# Patient Record
Sex: MALE | Race: ASIAN | NOT HISPANIC OR LATINO | Employment: OTHER | URBAN - METROPOLITAN AREA
[De-identification: names, ages, dates, MRNs, and addresses within clinical notes are randomized per-mention and may not be internally consistent; named-entity substitution may affect disease eponyms.]

---

## 2018-06-19 ENCOUNTER — HOSPITAL ENCOUNTER (EMERGENCY)
Facility: HOSPITAL | Age: 33
Discharge: HOME | End: 2018-06-19
Attending: EMERGENCY MEDICINE | Admitting: EMERGENCY MEDICINE
Payer: MEDICARE

## 2018-06-19 VITALS
HEIGHT: 72 IN | RESPIRATION RATE: 18 BRPM | DIASTOLIC BLOOD PRESSURE: 86 MMHG | OXYGEN SATURATION: 96 % | SYSTOLIC BLOOD PRESSURE: 144 MMHG | HEART RATE: 86 BPM | TEMPERATURE: 99.1 F | BODY MASS INDEX: 31.15 KG/M2 | WEIGHT: 230 LBS

## 2018-06-19 DIAGNOSIS — R21 RASH: Primary | ICD-10-CM

## 2018-06-19 PROCEDURE — 99283 EMERGENCY DEPT VISIT LOW MDM: CPT

## 2018-06-19 RX ORDER — PREDNISONE 10 MG/1
20 TABLET ORAL DAILY
Qty: 10 TABLET | Refills: 0 | Status: SHIPPED | OUTPATIENT
Start: 2018-06-19 | End: 2018-06-24

## 2018-06-19 RX ORDER — PREDNISONE 20 MG/1
40 TABLET ORAL ONCE
Status: COMPLETED | OUTPATIENT
Start: 2018-06-19 | End: 2018-06-19

## 2018-06-19 RX ADMIN — PREDNISONE 40 MG: 20 TABLET ORAL at 14:16

## 2018-06-19 NOTE — ED PROVIDER NOTES
History  Chief Complaint   Patient presents with    Rash     pt presents to the ed with rash along right arm  pt states that he was recently started on a new medication      38-year-old male presents with rash to his right forearm  Patient states if he starts scratching at them keep itching  No fevers no chills no signs of infection no other complaints patient states has been there for 2-3 days  History provided by:  Patient   used: No        None       Past Medical History:   Diagnosis Date    Psychiatric disorder     Rapid heart rate        History reviewed  No pertinent surgical history  History reviewed  No pertinent family history  I have reviewed and agree with the history as documented  Social History   Substance Use Topics    Smoking status: Current Every Day Smoker     Packs/day: 0 50    Smokeless tobacco: Not on file    Alcohol use Yes        Review of Systems   Constitutional: Negative for activity change, chills, diaphoresis and fever  HENT: Negative for congestion, ear pain, nosebleeds, sore throat, trouble swallowing and voice change  Eyes: Negative for pain, discharge and redness  Respiratory: Negative for apnea, cough, choking, shortness of breath, wheezing and stridor  Cardiovascular: Negative for chest pain and palpitations  Gastrointestinal: Negative for abdominal distention, abdominal pain, constipation, diarrhea, nausea and vomiting  Endocrine: Negative for polydipsia  Genitourinary: Negative for difficulty urinating, dysuria, flank pain, frequency, hematuria and urgency  Musculoskeletal: Negative for back pain, gait problem, joint swelling, myalgias, neck pain and neck stiffness  Skin: Positive for rash  Negative for pallor  Neurological: Negative for dizziness, tremors, syncope, speech difficulty, weakness, numbness and headaches  Hematological: Negative for adenopathy     Psychiatric/Behavioral: Negative for confusion, hallucinations, self-injury and suicidal ideas  The patient is not nervous/anxious  Physical Exam  Physical Exam   Constitutional: He is oriented to person, place, and time  Vital signs are normal  He appears well-developed and well-nourished  HENT:   Head: Normocephalic and atraumatic  Eyes: EOM are normal  Pupils are equal, round, and reactive to light  Neck: Normal range of motion  Neck supple  Cardiovascular: Normal rate  Pulmonary/Chest: Effort normal and breath sounds normal    Abdominal: Soft  Bowel sounds are normal    Musculoskeletal: Normal range of motion  Neurological: He is alert and oriented to person, place, and time  Skin: Rash noted  Psychiatric: He has a normal mood and affect  Nursing note and vitals reviewed  Vital Signs  ED Triage Vitals [06/19/18 1339]   Temperature Pulse Respirations Blood Pressure SpO2   99 1 °F (37 3 °C) 86 18 144/86 96 %      Temp Source Heart Rate Source Patient Position - Orthostatic VS BP Location FiO2 (%)   Tympanic Monitor Lying Right arm --      Pain Score       --           Vitals:    06/19/18 1339   BP: 144/86   Pulse: 86   Patient Position - Orthostatic VS: Lying       Visual Acuity      ED Medications  Medications   predniSONE tablet 40 mg (not administered)       Diagnostic Studies  Results Reviewed     None                 No orders to display              Procedures  Procedures       Phone Contacts  ED Phone Contact    ED Course                               MDM  CritCare Time    Disposition  Final diagnoses:   Rash     Time reflects when diagnosis was documented in both MDM as applicable and the Disposition within this note     Time User Action Codes Description Comment    6/19/2018  2:14 PM Justin Treviño Add [R21] Rash       ED Disposition     ED Disposition Condition Comment    Discharge  Thera Homans discharge to home/self care      Condition at discharge: Stable        Follow-up Information     Follow up With Specialties Details Why Contact Info Additional Information    [facility] Emergency Department Emergency Medicine  As needed [place] [id number] [phone] [facility] ED, [facility], [address]          Patient's Medications   Discharge Prescriptions    PREDNISONE 10 MG TABLET    Take 2 tablets (20 mg total) by mouth daily for 5 days       Start Date: 6/19/2018 End Date: 6/24/2018       Order Dose: 20 mg       Quantity: 10 tablet    Refills: 0     No discharge procedures on file      ED Provider  Electronically Signed by           Josemanuel Henson DO  06/19/18 5076

## 2018-06-19 NOTE — DISCHARGE INSTRUCTIONS
Acute Rash   WHAT YOU NEED TO KNOW:   A rash is irritation, redness, or itchiness in the skin or mucus membranes  Mucus membranes are areas such as the lining of your nose or throat  Acute means the rash starts suddenly, worsens quickly, and lasts a short time  An acute rash may be caused by a disease, such as hepatitis or vasculitis  The rash may be a reaction to something you are allergic to, such as certain foods, or latex  Certain medicines, including antibiotics, NSAIDs, prescription pain medicine, and aspirin can also cause a rash  DISCHARGE INSTRUCTIONS:   Return to the emergency department if:   · You have sudden trouble breathing or chest pain  · You are vomiting, have a headache or muscle aches, and your throat hurts  Contact your healthcare provider if:   · You have a fever  · You get open wounds from scratching your skin, or you have a wound that is red, swollen, or painful  · Your rash lasts longer than 3 months  · You have swelling or pain in your joints  · You have questions or concerns about your condition or care  Medicines:  If your rash does not go away on its own, you may need the following medicines:  · Antihistamines  may be given to help decrease itching  · Steroids  may be given to decrease inflammation  · Antibiotics  help fight or prevent a bacterial infection  · Take your medicine as directed  Contact your healthcare provider if you think your medicine is not helping or if you have side effects  Tell him of her if you are allergic to any medicine  Keep a list of the medicines, vitamins, and herbs you take  Include the amounts, and when and why you take them  Bring the list or the pill bottles to follow-up visits  Carry your medicine list with you in case of an emergency  Prevent a rash or care for your skin when you have a rash:  Dry skin can lead to more problems  Do not scratch your skin if it itches  You may cause a skin infection by scratching   The following may prevent dry skin, and help your skin look better:  · Use thick cream lotions or petroleum jelly to help soothe your rash  These products work well on areas with thick skin, such as your feet  Cool compresses may also be used to soothe your skin  Apply a cool compress or a cool, wet towel, and then cover it with a dry towel  · Use lukewarm water when you bathe  Hot water may damage your skin more  Pat your skin dry  Do not rub your skin with a towel  · Use detergents, soaps, shampoos, and bubble baths made for sensitive skin  Wear clothes that are made of cotton instead of nylon or wool  Cotton is softer, so it will not hurt your skin as much  Follow up with your healthcare provider as directed: You may need to see a dermatologist if healthcare providers do not know what is causing your rash  You may also need to see a dermatologist if your rash does not get better even with treatment  You may need to see a dietitian if you have allergies to foods  Write down your questions so you remember to ask them during your visits  © 2017 [facility] Information is for End User's use only and may not be sold, redistributed or otherwise used for commercial purposes  All illustrations and images included in CareNotes® are the copyrighted property of [employer]  or [employer]  The above information is an  only  It is not intended as medical advice for individual conditions or treatments  Talk to your doctor, nurse or pharmacist before following any medical regimen to see if it is safe and effective for you

## 2018-06-21 ENCOUNTER — HOSPITAL ENCOUNTER (EMERGENCY)
Facility: HOSPITAL | Age: 33
Discharge: HOME | End: 2018-06-21
Attending: EMERGENCY MEDICINE | Admitting: EMERGENCY MEDICINE
Payer: MEDICARE

## 2018-06-21 VITALS
RESPIRATION RATE: 18 BRPM | DIASTOLIC BLOOD PRESSURE: 92 MMHG | HEIGHT: 73 IN | TEMPERATURE: 97.1 F | BODY MASS INDEX: 30.48 KG/M2 | WEIGHT: 230 LBS | SYSTOLIC BLOOD PRESSURE: 156 MMHG | OXYGEN SATURATION: 97 % | HEART RATE: 95 BPM

## 2018-06-21 DIAGNOSIS — F41.9 ANXIETY: ICD-10-CM

## 2018-06-21 DIAGNOSIS — Z76.0 MEDICATION REFILL: Primary | ICD-10-CM

## 2018-06-21 PROCEDURE — 99281 EMR DPT VST MAYX REQ PHY/QHP: CPT

## 2018-06-21 NOTE — ED NOTES
6/21/18 @ 1430:  ED RN states that patient says his Xanax fell in the toilet bowl and is requesting a refill  RN reports that patient has a history of Schizophrenia and was discharged from [place]  RN doesn't know what medication patient is prescribed, and asked if PES could investigate  [address] [facility], [address] [place] [address]: PES contacted [facility] because if patient was in [place], he was committed, and family guidance would have records  Jazlyn Johnson searched, but was unable to locate any records  Jazlyn Johnson suggests that patient was not living in this county when committed  PES reported to RN  Patient was discharged home to his appointment with Dr Clover Troncoso on July 3rd    [facility] [place] [place]

## 2018-06-21 NOTE — DISCHARGE INSTRUCTIONS
Please take a list of all of your medications and discharge paperwork with you to all of your follow-up medical visits  Please take all of your medications as directed  Please call your family doctor or return to the ER if you have increased shortness of breath, chest pain, fevers, chills, nausea, vomiting, diarrhea, or any other worsening symptoms  Anxiety   WHAT YOU SHOULD KNOW:   Anxiety is a condition that causes you to feel excessive worry, uneasiness, or fear  Family or work stress, smoking, caffeine, and alcohol can increase your risk for anxiety  Certain medicines or health conditions can also increase your risk  Anxiety may begin gradually, and can become a long-term condition if it is not managed or treated  AFTER YOU LEAVE:   Medicines:   · Medicines  can help you feel more calm and relaxed, and decrease your symptoms  · Take your medicine as directed  Contact your healthcare provider if you think your medicine is not helping or if you have side effects  Tell him if you are allergic to any medicine  Keep a list of the medicines, vitamins, and herbs you take  Include the amounts, and when and why you take them  Bring the list or the pill bottles to follow-up visits  Carry your medicine list with you in case of an emergency  Follow up with your healthcare provider within 2 weeks or as directed:  Write down your questions so you remember to ask them during your visits  Manage anxiety:   · Go to counseling as directed  Cognitive behavioral therapy can help you understand and change how you react to events that trigger your symptoms  · Find ways to manage your symptoms  Activities such as exercise, meditation, or listening to music can help you relax  · Practice deep breathing  Breathing can change how your body reacts to stress  Focus on taking slow, deep breaths several times a day, or during an anxiety attack  Breathe in through your nose, and out through your mouth       · Avoid caffeine  Caffeine can make your symptoms worse  Avoid foods or drinks that are meant to increase your energy level  · Limit or avoid alcohol  Ask your healthcare provider if alcohol is safe for you  You may not be able to drink alcohol if you take certain anxiety or depression medicines  Limit alcohol to 1 drink per day if you are a woman  Limit alcohol to 2 drinks per day if you are a man  A drink of alcohol is 12 ounces of beer, 5 ounces of wine, or 1½ ounces of liquor  Contact your healthcare provider if:   · Your symptoms get worse or do not get better with treatment  · You think your medicine may be causing side effects  · Your anxiety keeps you from doing your regular daily activities  · You have new symptoms since your last visit  · You have questions or concerns about your condition or care  Seek care immediately or call 911 if:   · You have chest pain, tightness, or heaviness that may spread to your shoulders, arms, jaw, neck, or back  · You feel like hurting yourself or someone else  · You feel dizzy, lightheaded, or faint  © 2014 [address] [employer] is for End User's use only and may not be sold, redistributed or otherwise used for commercial purposes  All illustrations and images included in CareNotes® are the copyrighted property of A [employer]  or [employer]  The above information is an  only  It is not intended as medical advice for individual conditions or treatments  Talk to your doctor, nurse or pharmacist before following any medical regimen to see if it is safe and effective for you

## 2018-06-21 NOTE — ED PROVIDER NOTES
History  Chief Complaint   Patient presents with    Medication Refill     xanax pill bottle opened two days and some fell down the sink  appointment with Dr Aubrie Bradley on July 3   no other doctor at this time  doctor at [facility] prescribed meds  did not try to contact this doctor  78-year-old male with past medical history of anxiety, presents to the ER for refill of his Xanax prescription  Patient states that he was recently hospitalized at a psychiatric facility for anxiety  Patient was discharged on Xanax 1 mg b i d   Patient states that earlier today he dropped his Xanax bottle in the sink and lost a lot of pills  Patient has an appointment with Dr Aubrie Bradley on 07/03/2018  Patient did not call his psychiatrist office to request for refills  Patient denies any other complaints at this time        History provided by:  Patient  Medication Refill       Prior to Admission Medications   Prescriptions Last Dose Informant Patient Reported? Taking?   predniSONE 10 mg tablet 6/21/2018 at Unknown time  No Yes   Sig: Take 2 tablets (20 mg total) by mouth daily for 5 days      Facility-Administered Medications: None       Past Medical History:   Diagnosis Date    Psychiatric disorder     Rapid heart rate        History reviewed  No pertinent surgical history  History reviewed  No pertinent family history  I have reviewed and agree with the history as documented  Social History   Substance Use Topics    Smoking status: Current Every Day Smoker     Packs/day: 0 50    Smokeless tobacco: Never Used    Alcohol use Yes        Review of Systems   Constitutional: Negative for activity change, fatigue and fever  HENT: Negative for congestion, ear discharge and sore throat  Eyes: Negative for pain and redness  Respiratory: Negative for cough, chest tightness, shortness of breath and wheezing  Cardiovascular: Negative for chest pain     Gastrointestinal: Negative for abdominal pain, diarrhea, nausea and vomiting  Endocrine: Negative for cold intolerance  Genitourinary: Negative for dysuria and urgency  Musculoskeletal: Negative for arthralgias and back pain  Neurological: Negative for dizziness, weakness and headaches  Psychiatric/Behavioral: Negative for agitation and behavioral problems  Physical Exam  Physical Exam   Constitutional: He is oriented to person, place, and time  He appears well-developed and well-nourished  HENT:   Head: Normocephalic and atraumatic  Nose: Nose normal    Mouth/Throat: Oropharynx is clear and moist    Eyes: Conjunctivae and EOM are normal    Neck: Normal range of motion  Neck supple  Cardiovascular: Normal rate, regular rhythm and normal heart sounds  Pulmonary/Chest: Effort normal and breath sounds normal    Abdominal: Soft  Bowel sounds are normal  He exhibits no distension  There is no tenderness  Musculoskeletal: Normal range of motion  Neurological: He is alert and oriented to person, place, and time  Skin: Skin is warm  Psychiatric: He has a normal mood and affect  His behavior is normal  Judgment and thought content normal    Nursing note and vitals reviewed        Vital Signs  ED Triage Vitals   Temperature Pulse Respirations Blood Pressure SpO2   06/21/18 1416 06/21/18 1416 06/21/18 1416 06/21/18 1416 06/21/18 1416   (!) 97 1 °F (36 2 °C) 95 18 156/92 97 %      Temp Source Heart Rate Source Patient Position - Orthostatic VS BP Location FiO2 (%)   06/21/18 1416 06/21/18 1416 06/21/18 1416 06/21/18 1416 --   Tympanic Monitor Sitting Left arm       Pain Score       06/21/18 1422       No Pain           Vitals:    06/21/18 1416   BP: 156/92   Pulse: 95   Patient Position - Orthostatic VS: Sitting       Visual Acuity      ED Medications  Medications - No data to display    Diagnostic Studies  Results Reviewed     None                 No orders to display              Procedures  Procedures       Phone Contacts  ED Phone Contact    ED Course MDM  Number of Diagnoses or Management Options  Anxiety:   Medication refill:   Risk of Complications, Morbidity, and/or Mortality  General comments: Patient presented for medication refill  Discussed with patient that we cannot refill chronic controlled substances from the ER  Patient needs to call his psychiatrist for refill of his Xanax prescription  At this point patient is discharged home with follow-up to his psychiatrist as scheduled  Patient was told to call the psychiatrist office to see if he can get an expedited appointment  Patient Progress  Patient progress: stable    CritCare Time    Disposition  Final diagnoses:   Medication refill   Anxiety     Time reflects when diagnosis was documented in both MDM as applicable and the Disposition within this note     Time User Action Codes Description Comment    6/21/2018  2:51 PM Weldon Opitz Add [Z76 0] Medication refill     6/21/2018  2:51 PM Weldon Opitz Add [F41 9] Anxiety       ED Disposition     ED Disposition Condition Comment    Discharge  Dale Jack discharge to home/self care  Condition at discharge: Good        Follow-up Information     Follow up With Specialties Details Why Contact Info    Michael Ponce MD Psychiatry  [facility]  [phone]            Discharge Medication List as of 6/21/2018  2:52 PM      CONTINUE these medications which have NOT CHANGED    Details   predniSONE 10 mg tablet Take 2 tablets (20 mg total) by mouth daily for 5 days, Starting Tue 6/19/2018, Until Sun 6/24/2018, Print           No discharge procedures on file      ED Provider  Electronically Signed by           Sanna Costello DO  06/21/18 4533

## 2018-06-24 ENCOUNTER — HOSPITAL ENCOUNTER (EMERGENCY)
Facility: HOSPITAL | Age: 33
Discharge: ANOTHER HOSPITAL | End: 2018-06-25
Attending: EMERGENCY MEDICINE | Admitting: EMERGENCY MEDICINE
Payer: MEDICARE

## 2018-06-24 DIAGNOSIS — R44.0 AUDITORY HALLUCINATIONS: Primary | ICD-10-CM

## 2018-06-24 DIAGNOSIS — R45.851 SUICIDAL IDEATIONS: ICD-10-CM

## 2018-06-24 LAB
ALBUMIN SERPL BCP-MCNC: 4.1 G/DL (ref 3.5–5)
ALP SERPL-CCNC: 58 U/L (ref 46–116)
ALT SERPL W P-5'-P-CCNC: 31 U/L (ref 12–78)
AMPHETAMINES SERPL QL SCN: NEGATIVE
ANION GAP SERPL CALCULATED.3IONS-SCNC: 8 MMOL/L (ref 4–13)
APTT PPP: 26 SECONDS (ref 24–36)
AST SERPL W P-5'-P-CCNC: 11 U/L (ref 5–45)
BARBITURATES UR QL: NEGATIVE
BASOPHILS # BLD AUTO: 0.03 THOUSANDS/ΜL (ref 0–0.1)
BASOPHILS NFR BLD AUTO: 0 % (ref 0–1)
BENZODIAZ UR QL: NEGATIVE
BILIRUB SERPL-MCNC: 0.2 MG/DL (ref 0.2–1)
BILIRUB UR QL STRIP: NEGATIVE
BUN SERPL-MCNC: 11 MG/DL (ref 5–25)
CALCIUM SERPL-MCNC: 8.7 MG/DL (ref 8.3–10.1)
CHLORIDE SERPL-SCNC: 108 MMOL/L (ref 100–108)
CLARITY UR: CLEAR
CO2 SERPL-SCNC: 25 MMOL/L (ref 21–32)
COCAINE UR QL: NEGATIVE
COLOR UR: NORMAL
CREAT SERPL-MCNC: 0.8 MG/DL (ref 0.6–1.3)
EOSINOPHIL # BLD AUTO: 0.15 THOUSAND/ΜL (ref 0–0.61)
EOSINOPHIL NFR BLD AUTO: 2 % (ref 0–6)
ERYTHROCYTE [DISTWIDTH] IN BLOOD BY AUTOMATED COUNT: 13.6 % (ref 11.6–15.1)
ETHANOL SERPL-MCNC: <3 MG/DL (ref 0–3)
GFR SERPL CREATININE-BSD FRML MDRD: 116 ML/MIN/1.73SQ M
GLUCOSE SERPL-MCNC: 114 MG/DL (ref 65–140)
GLUCOSE UR STRIP-MCNC: NEGATIVE MG/DL
HCT VFR BLD AUTO: 46.1 % (ref 36.5–49.3)
HGB BLD-MCNC: 14.6 G/DL (ref 12–17)
HGB UR QL STRIP.AUTO: NEGATIVE
IMM GRANULOCYTES # BLD AUTO: 0.04 THOUSAND/UL (ref 0–0.2)
IMM GRANULOCYTES NFR BLD AUTO: 0 % (ref 0–2)
INR PPP: 0.98 (ref 0.86–1.16)
KETONES UR STRIP-MCNC: NEGATIVE MG/DL
LEUKOCYTE ESTERASE UR QL STRIP: NEGATIVE
LYMPHOCYTES # BLD AUTO: 3.24 THOUSANDS/ΜL (ref 0.6–4.47)
LYMPHOCYTES NFR BLD AUTO: 33 % (ref 14–44)
MCH RBC QN AUTO: 29.7 PG (ref 26.8–34.3)
MCHC RBC AUTO-ENTMCNC: 31.7 G/DL (ref 31.4–37.4)
MCV RBC AUTO: 94 FL (ref 82–98)
METHADONE UR QL: NEGATIVE
MONOCYTES # BLD AUTO: 0.62 THOUSAND/ΜL (ref 0.17–1.22)
MONOCYTES NFR BLD AUTO: 6 % (ref 4–12)
NEUTROPHILS # BLD AUTO: 5.73 THOUSANDS/ΜL (ref 1.85–7.62)
NEUTS SEG NFR BLD AUTO: 59 % (ref 43–75)
NITRITE UR QL STRIP: NEGATIVE
NRBC BLD AUTO-RTO: 0 /100 WBCS
OPIATES UR QL SCN: NEGATIVE
PCP UR QL: NEGATIVE
PH UR STRIP.AUTO: 6.5 [PH] (ref 5–9)
PLATELET # BLD AUTO: 336 THOUSANDS/UL (ref 149–390)
PMV BLD AUTO: 9.1 FL (ref 8.9–12.7)
POTASSIUM SERPL-SCNC: 4.3 MMOL/L (ref 3.5–5.3)
PROT SERPL-MCNC: 6.8 G/DL (ref 6.4–8.2)
PROT UR STRIP-MCNC: NEGATIVE MG/DL
PROTHROMBIN TIME: 10.3 SECONDS (ref 9.4–11.7)
RBC # BLD AUTO: 4.91 MILLION/UL (ref 3.88–5.62)
SODIUM SERPL-SCNC: 141 MMOL/L (ref 136–145)
SP GR UR STRIP.AUTO: <=1.005 (ref 1–1.03)
THC UR QL: POSITIVE
UROBILINOGEN UR QL STRIP.AUTO: 0.2 E.U./DL
WBC # BLD AUTO: 9.81 THOUSAND/UL (ref 4.31–10.16)

## 2018-06-24 PROCEDURE — 85610 PROTHROMBIN TIME: CPT | Performed by: PHYSICIAN ASSISTANT

## 2018-06-24 PROCEDURE — 85025 COMPLETE CBC W/AUTO DIFF WBC: CPT | Performed by: PHYSICIAN ASSISTANT

## 2018-06-24 PROCEDURE — 80307 DRUG TEST PRSMV CHEM ANLYZR: CPT | Performed by: PHYSICIAN ASSISTANT

## 2018-06-24 PROCEDURE — 81003 URINALYSIS AUTO W/O SCOPE: CPT | Performed by: PHYSICIAN ASSISTANT

## 2018-06-24 PROCEDURE — 36415 COLL VENOUS BLD VENIPUNCTURE: CPT | Performed by: PHYSICIAN ASSISTANT

## 2018-06-24 PROCEDURE — 80320 DRUG SCREEN QUANTALCOHOLS: CPT | Performed by: PHYSICIAN ASSISTANT

## 2018-06-24 PROCEDURE — 80053 COMPREHEN METABOLIC PANEL: CPT | Performed by: PHYSICIAN ASSISTANT

## 2018-06-24 PROCEDURE — 85730 THROMBOPLASTIN TIME PARTIAL: CPT | Performed by: PHYSICIAN ASSISTANT

## 2018-06-24 RX ORDER — HYDRALAZINE HYDROCHLORIDE 50 MG/1
50 TABLET, FILM COATED ORAL 3 TIMES DAILY
COMMUNITY
End: 2018-07-07 | Stop reason: ALTCHOICE

## 2018-06-24 RX ORDER — ALPRAZOLAM 0.5 MG/1
1 TABLET ORAL 2 TIMES DAILY
COMMUNITY
End: 2018-08-05

## 2018-06-24 RX ORDER — ZOLPIDEM TARTRATE 5 MG/1
10 TABLET ORAL
Status: DISCONTINUED | OUTPATIENT
Start: 2018-06-24 | End: 2018-06-25 | Stop reason: HOSPADM

## 2018-06-24 RX ORDER — LORAZEPAM 1 MG/1
2 TABLET ORAL ONCE
Status: COMPLETED | OUTPATIENT
Start: 2018-06-24 | End: 2018-06-24

## 2018-06-24 RX ORDER — ZOLPIDEM TARTRATE 12.5 MG/1
10 TABLET, FILM COATED, EXTENDED RELEASE ORAL
COMMUNITY
End: 2018-07-07 | Stop reason: ALTCHOICE

## 2018-06-24 RX ORDER — PREDNISONE 1 MG/1
TABLET ORAL 2 TIMES DAILY WITH MEALS
COMMUNITY
End: 2018-07-07 | Stop reason: ALTCHOICE

## 2018-06-24 RX ORDER — ASPIRIN 81 MG/1
81 TABLET ORAL DAILY
COMMUNITY

## 2018-06-24 RX ORDER — LAMOTRIGINE 100 MG/1
25 TABLET ORAL
COMMUNITY
End: 2018-07-06

## 2018-06-24 RX ORDER — METOPROLOL TARTRATE 50 MG/1
25 TABLET, FILM COATED ORAL EVERY 12 HOURS SCHEDULED
COMMUNITY

## 2018-06-24 RX ADMIN — ZOLPIDEM TARTRATE 10 MG: 5 TABLET ORAL at 21:08

## 2018-06-24 RX ADMIN — LORAZEPAM 2 MG: 1 TABLET ORAL at 14:56

## 2018-06-24 RX ADMIN — LORAZEPAM 2 MG: 1 TABLET ORAL at 09:46

## 2018-06-24 RX ADMIN — METOPROLOL TARTRATE 25 MG: 25 TABLET, FILM COATED ORAL at 15:28

## 2018-06-24 NOTE — ED NOTES
Pes assessed patient and he reported that he came to the er because he was having paranoid thoughts that people wanted bad things for him to happen  He was also having auditory hallucinations of conversations arguing with the devil  He reported SI with no plan, last suicide attempt was 8 years ago(OD)  He has been hospitalized numerous times over the years  His last hospitalization was earlier this year at [place]  He reported sleep has become more of a struggle lately and did not sleep at all last night  No HI reported  Patient agrees to voluntary hospitalization  He is not sure as to what triggered all of this as he was unable to identify any major life changes or anniversary dates

## 2018-06-24 NOTE — ED NOTES
0800  PES called [facility] and [facility] to see if they had any beds and they did not  I called carrier to see if they had a bed and they did so I faxed referral to them  They said it would be a while before they could review  1000 Pes called carrier and they still had not reviewed but would get to it soon  80 Car[facility] called back to ask for copy of insurance cards and I sent to them    1130 carrier called back to let me know patient had blown through all of his lifetime medicare days and they could not accept him  1200 [facility] to [facility] if [facility] and they had a male voluntary bed so I faxed referral to them  They told me they were really busy and it would be a while before they got back to me  1430 [facility] called back to say their doctor declined patient as he didn't feel patient was appropriate for the unit  1500 PES called [facility] to see if they had any beds and I was unable to get through  I left a message for and and told her I faxed referral over to review if they had a bed  [facility] called back and they said their ER was packed and they had no beds to give away  [phone] I called [facility] to see if they had beds available and they said they did so I sent referral to them

## 2018-06-24 NOTE — ED NOTES
working on bed placement for patient,patient informed he will be informed when bed assignment is achieved  Patient noted to be slightly anxious but cooperative to staff       Aleyda Jones RN  06/24/18 9099

## 2018-06-24 NOTE — ED NOTES
Pt  Belongings:   Med list  Watch  [facility]  Cell phone  Ear buds  Sneakers  [address] [facility]  Mary Condon  06/24/18 5461

## 2018-06-24 NOTE — ED PROVIDER NOTES
History  Chief Complaint   Patient presents with    Psychiatric Evaluation     pt states he was playing video games last night & became paranoid after hallacuniting & hearing voices  states hes suicidal       Patient is a 78-year-old male presents for evaluation of suicidal ideation  The patient reports that he has a long history hearing voices which have been coming and going  Recently since yesterday they have increased in returned and the patient describes as the voices a conversation between him and the devil  The patient states that he does have suicidal ideation but without a plan  Denies homicidal ideation  He takes all of his psychiatric medications as instructed  He denies any chest pain shortness of breath  Denies any abdominal pain  Denies any nausea vomiting or diarrhea  Is requesting something for anxiety  Prior to Admission Medications   Prescriptions Last Dose Informant Patient Reported? Taking?    ALPRAZolam (XANAX) 0 5 mg tablet   Yes Yes   Sig: Take 0 5 mg by mouth 2 (two) times a day 1 mg every morning, 0 5 mg @@ noon   aspirin (ECOTRIN LOW STRENGTH) 81 mg EC tablet   Yes Yes   Sig: Take 81 mg by mouth daily   bisacodyl (DULCOLAX) 5 mg EC tablet   Yes Yes   Sig: Take 15 mg by mouth daily   hydrALAZINE (APRESOLINE) 50 mg tablet   Yes Yes   Sig: Take 50 mg by mouth 3 (three) times a day   lamoTRIgine (LaMICtal) 100 mg tablet   Yes Yes   Sig: Take 25 mg by mouth daily with dinner   metoprolol tartrate (LOPRESSOR) 50 mg tablet   Yes Yes   Sig: Take 25 mg by mouth every 12 (twelve) hours   predniSONE 1 mg tablet   Yes Yes   Sig: Take by mouth 2 (two) times a day with meals   zolpidem (AMBIEN CR) 12 5 MG CR tablet   Yes Yes   Sig: Take 10 mg by mouth daily at bedtime      Facility-Administered Medications: None       Past Medical History:   Diagnosis Date    Diabetes mellitus ([facility] [address] )     Hyperlipidemia     Hypertension     Psychiatric disorder     anxiety, depression, SI, hallucinating    Rapid heart rate        History reviewed  No pertinent surgical history  History reviewed  No pertinent family history  I have reviewed and agree with the history as documented  Social History   Substance Use Topics    Smoking status: Current Every Day Smoker     Packs/day: 0 50    Smokeless tobacco: Never Used    Alcohol use Yes        Review of Systems   Constitutional: Negative for activity change, appetite change and fatigue  HENT: Negative for nosebleeds, sneezing, sore throat, trouble swallowing and voice change  Eyes: Negative for photophobia, pain and visual disturbance  Respiratory: Negative for apnea, choking and stridor  Cardiovascular: Negative for palpitations and leg swelling  Gastrointestinal: Negative for anal bleeding and constipation  Endocrine: Negative for cold intolerance, heat intolerance, polydipsia and polyphagia  Genitourinary: Negative for decreased urine volume, enuresis, frequency, genital sores and urgency  Musculoskeletal: Negative for joint swelling and myalgias  Allergic/Immunologic: Negative for environmental allergies and food allergies  Neurological: Negative for tremors, seizures, speech difficulty and weakness  Hematological: Negative for adenopathy  Psychiatric/Behavioral: Positive for hallucinations and suicidal ideas  Negative for behavioral problems, decreased concentration and dysphoric mood  The patient is nervous/anxious  Physical Exam  Physical Exam   Constitutional: He is oriented to person, place, and time  He appears well-developed and well-nourished  No distress  HENT:   Head: Normocephalic and atraumatic  Right Ear: External ear normal    Left Ear: External ear normal    Nose: Nose normal    Mouth/Throat: Oropharynx is clear and moist    Eyes: Conjunctivae and EOM are normal  Pupils are equal, round, and reactive to light  Neck: Normal range of motion  Neck supple     Cardiovascular: Normal rate, regular rhythm and normal heart sounds  Exam reveals no gallop and no friction rub  No murmur heard  Pulmonary/Chest: Effort normal and breath sounds normal  No respiratory distress  He has no wheezes  Abdominal: Soft  Bowel sounds are normal    Neurological: He is alert and oriented to person, place, and time  No cranial nerve deficit  Skin: Skin is warm and dry  No rash noted  He is not diaphoretic  No erythema  No pallor  Psychiatric: His mood appears anxious  He is slowed  He expresses suicidal ideation  He expresses no suicidal plans  Vitals reviewed  Vital Signs  ED Triage Vitals [06/24/18 0908]   Temperature Pulse Respirations Blood Pressure SpO2   98 °F (36 7 °C) 69 16 153/99 99 %      Temp Source Heart Rate Source Patient Position - Orthostatic VS BP Location FiO2 (%)   Temporal Monitor Sitting Left arm --      Pain Score       No Pain           Vitals:    06/24/18 0908   BP: 153/99   Pulse: 69   Patient Position - Orthostatic VS: Sitting       Visual Acuity      ED Medications  Medications   LORazepam (ATIVAN) tablet 2 mg (2 mg Oral Given 6/24/18 0946)       Diagnostic Studies  Results Reviewed     Procedure Component Value Units Date/Time    Rapid drug screen, urine [[id number]]  (Abnormal) Collected:  06/24/18 0946    Lab Status:  Final result Specimen:  Urine from Urine, Catheter Updated:  06/24/18 1014     Amph/Meth UR Negative     Barbiturate Ur Negative     Benzodiazepine Urine Negative     Cocaine Urine Negative     Methadone Urine Negative     Opiate Urine Negative     PCP Ur Negative     THC Urine Positive (A)    Narrative:         Presumptive report  If requested, specimen will be sent to reference lab for confirmation  FOR MEDICAL PURPOSES ONLY  IF CONFIRMATION NEEDED PLEASE CONTACT THE LAB WITHIN 5 DAYS      Drug Screen Cutoff Levels:  AMPHETAMINE/METHAMPHETAMINES  1000 ng/mL  BARBITURATES     200 ng/mL  BENZODIAZEPINES     200 ng/mL  COCAINE      300 ng/mL  METHADONE      300 ng/mL  OPIATES      300 ng/mL  PHENCYCLIDINE     25 ng/mL  THC       50 ng/mL    Comprehensive metabolic panel [[id number]] Collected:  06/24/18 0951    Lab Status:  Final result Specimen:  Blood from Arm, Right Updated:  06/24/18 1014     Sodium 141 mmol/L      Potassium 4 3 mmol/L      Chloride 108 mmol/L      CO2 25 mmol/L      Anion Gap 8 mmol/L      BUN 11 mg/dL      Creatinine 0 80 mg/dL      Glucose 114 mg/dL      Calcium 8 7 mg/dL      AST 11 U/L      ALT 31 U/L      Alkaline Phosphatase 58 U/L      Total Protein 6 8 g/dL      Albumin 4 1 g/dL      Total Bilirubin 0 20 mg/dL      eGFR 116 ml/min/1 73sq m     Narrative:         National Kidney Disease Education Program recommendations are as follows:  GFR calculation is accurate only with a steady state creatinine  Chronic Kidney disease less than 60 ml/min/1 73 sq  meters  Kidney failure less than 15 ml/min/1 73 sq  meters      Ethanol [[id number]]  (Normal) Collected:  06/24/18 0951    Lab Status:  Final result Specimen:  Blood from Arm, Right Updated:  06/24/18 1013     Ethanol Lvl <3 mg/dL     Protime-INR [[id number]]  (Normal) Collected:  06/24/18 0951    Lab Status:  Final result Specimen:  Blood from Arm, Right Updated:  06/24/18 1009     Protime 10 3 seconds      INR 0 98    APTT [[id number]]  (Normal) Collected:  06/24/18 0951    Lab Status:  Final result Specimen:  Blood from Arm, Right Updated:  06/24/18 1009     PTT 26 seconds     UA w Reflex to Microscopic w Reflex to Culture [[id number]] Collected:  06/24/18 0946    Lab Status:  Final result Specimen:  Urine from Urine, Clean Catch Updated:  06/24/18 0958     Color, UA Light Yellow     Clarity, UA Clear     Specific Gravity, UA <=1 005     pH, UA 6 5     Leukocytes, UA Negative     Nitrite, UA Negative     Protein, UA Negative mg/dl      Glucose, UA Negative mg/dl      Ketones, UA Negative mg/dl      Urobilinogen, UA 0 2 E U /dl      Bilirubin, UA Negative     Blood, UA Negative    CBC and differential [[id number]] Collected:  06/24/18 0951    Lab Status:  Final result Specimen:  Blood from Arm, Right Updated:  06/24/18 0956     WBC 9 81 Thousand/uL      RBC 4 91 Million/uL      Hemoglobin 14 6 g/dL      Hematocrit 46 1 %      MCV 94 fL      MCH 29 7 pg      MCHC 31 7 g/dL      RDW 13 6 %      MPV 9 1 fL      Platelets 129 Thousands/uL      nRBC 0 /100 WBCs      Neutrophils Relative 59 %      Immat GRANS % 0 %      Lymphocytes Relative 33 %      Monocytes Relative 6 %      Eosinophils Relative 2 %      Basophils Relative 0 %      Neutrophils Absolute 5 73 Thousands/µL      Immature Grans Absolute 0 04 Thousand/uL      Lymphocytes Absolute 3 24 Thousands/µL      Monocytes Absolute 0 62 Thousand/µL      Eosinophils Absolute 0 15 Thousand/µL      Basophils Absolute 0 03 Thousands/µL                  No orders to display              Procedures  Procedures       Phone Contacts  ED Phone Contact    ED Course                               MDM  Number of Diagnoses or Management Options  Diagnosis management comments: Patient is medically cleared for psychiatric inpatient admission  CritCare Time    Disposition  Final diagnoses:   None     ED Disposition     None      Follow-up Information    None         Patient's Medications   Discharge Prescriptions    No medications on file     No discharge procedures on file      ED Provider  Electronically Signed by           Lucho Romero PA-C  06/24/18 1118

## 2018-06-25 VITALS
OXYGEN SATURATION: 98 % | BODY MASS INDEX: 30.48 KG/M2 | DIASTOLIC BLOOD PRESSURE: 71 MMHG | SYSTOLIC BLOOD PRESSURE: 138 MMHG | TEMPERATURE: 98.3 F | RESPIRATION RATE: 20 BRPM | WEIGHT: 230 LBS | HEART RATE: 72 BPM | HEIGHT: 73 IN

## 2018-06-25 PROCEDURE — 99285 EMERGENCY DEPT VISIT HI MDM: CPT

## 2018-06-25 NOTE — ED NOTES
[facility] called back to say that Dr Momo Restrepo at [facility] accepted patient and to arrange for transport  I called [facility] and they can not transport patient until tomorrow and [facility] will call back with transport time once they move things around   Patient signed voluntary and it was faxed back to [facility] admissions department

## 2018-06-25 NOTE — EMTALA/ACUTE CARE TRANSFER
[address] [facility] EMERGENCY DEPARTMENT  [id number]  Dept: [phone]      EMTALA TRANSFER CONSENT    NAME Ana Alvarez                                         1985                              MRN [id number]    I have been informed of my rights regarding examination, treatment, and transfer   by Dr Juvenal Farooq MD    Benefits: Specialized equipment and/or services available at the receiving facility (Include comment)________________________    Risks: Potential for delay in receiving treatment, Increased discomfort during transfer, Possible worsening of condition or death during transfer      Consent for Transfer:  I acknowledge that my medical condition has been evaluated and explained to me by the emergency department physician or other qualified medical person and/or my attending physician, who has recommended that I be transferred to the service of  Accepting Physician: Dr Irasema Mcdaniel at [address] [facility] Name, [address] : [place], [place]  The above potential benefits of such transfer, the potential risks associated with such transfer, and the probable risks of not being transferred have been explained to me, and I fully understand them  The doctor has explained that, in my case, the benefits of transfer outweigh the risks  I agree to be transferred  I authorize the performance of emergency medical procedures and treatments upon me in both transit and upon arrival at the receiving facility  Additionally, I authorize the release of any and all medical records to the receiving facility and request they be transported with me, if possible  I understand that the safest mode of transportation during a medical emergency is an ambulance and that the Hospital advocates the use of this mode of transport   Risks of traveling to the receiving facility by car, including absence of medical control, life sustaining equipment, such as oxygen, and medical personnel has been explained to me and I fully understand them  (JUSTINA CORRECT BOX BELOW)  [  ]  I consent to the stated transfer and to be transported by ambulance/helicopter  [  ]  I consent to the stated transfer, but refuse transportation by ambulance and accept full responsibility for my transportation by car  I understand the risks of non-ambulance transfers and I exonerate the Hospital and its staff from any deterioration in my condition that results from this refusal     X___________________________________________    DATE  18  TIME________  Signature of patient or legally responsible individual signing on patient behalf           RELATIONSHIP TO PATIENT_________________________          Provider Certification    NAME Aida Isaacs                                        [facility] 1985                              MRN [id number]    A medical screening exam was performed on the above named patient  Based on the examination:    Condition Necessitating Transfer The primary encounter diagnosis was Auditory hallucinations  A diagnosis of Suicidal ideations was also pertinent to this visit      Patient Condition: The patient has been stabilized such that within reasonable medical probability, no material deterioration of the patient condition or the condition of the unborn child(garrick) is likely to result from the transfer    Reason for Transfer: Level of Care needed not available at this facility    Transfer Requirements: [address] [facility] [address] [place]   · Space available and qualified personnel available for treatment as acknowledged by    · Agreed to accept transfer and to provide appropriate medical treatment as acknowledged by       Dr Shae Marx  · Appropriate medical records of the examination and treatment of the patient are provided at the time of transfer   [address] _______  · Transfer will be performed by qualified personnel from and appropriate transfer equipment as required, including the use of necessary and appropriate life support measures  Provider Certification: I have examined the patient and explained the following risks and benefits of being transferred/refusing transfer to the patient/family:  The patient is stable for psychiatric transfer because they are medically stable, and is protected from harming him/herself or others during transport      Based on these reasonable risks and benefits to the patient and/or the unborn child(garrick), and based upon the information available at the time of the patients examination, I certify that the medical benefits reasonably to be expected from the provision of appropriate medical treatments at another medical facility outweigh the increasing risks, if any, to the individuals medical condition, and in the case of labor to the unborn child, from effecting the transfer      X____________________________________________ DATE 06/24/18        TIME_______      ORIGINAL - SEND TO MEDICAL RECORDS   COPY - SEND WITH PATIENT DURING TRANSFER

## 2018-06-25 NOTE — ED NOTES
Pt requesting something to sleep, Dr Zahra Ashford aware, Lidia Andrew ordered and given   Pt calm and cooperative at this time, remains on constant observation     Shonda Lozoya, RN  06/24/18 7224

## 2018-06-25 NOTE — EMTALA/ACUTE CARE TRANSFER
[facility] EMERGENCY DEPARTMENT  [address] [facility] [address]  Dept: [phone]      EMTALA TRANSFER CONSENT    NAME Thera Homans                                         1985                              MRN [id number]    I have been informed of my rights regarding examination, treatment, and transfer   by Dr Khanh Cleveland MD    Benefits: Specialized equipment and/or services available at the receiving facility (Include comment)________________________    Risks: Potential for delay in receiving treatment, Increased discomfort during transfer, Possible worsening of condition or death during transfer      Consent for Transfer:  I acknowledge that my medical condition has been evaluated and explained to me by the emergency department physician or other qualified medical person and/or my attending physician, who has recommended that I be transferred to the service of  Accepting Physician: Dr Odilia Vale at [address] [facility] Name, [address] : [place], [place]  The above potential benefits of such transfer, the potential risks associated with such transfer, and the probable risks of not being transferred have been explained to me, and I fully understand them  The doctor has explained that, in my case, the benefits of transfer outweigh the risks  I agree to be transferred  I authorize the performance of emergency medical procedures and treatments upon me in both transit and upon arrival at the receiving facility  Additionally, I authorize the release of any and all medical records to the receiving facility and request they be transported with me, if possible  I understand that the safest mode of transportation during a medical emergency is an ambulance and that the Hospital advocates the use of this mode of transport   Risks of traveling to the receiving facility by car, including absence of medical control, life sustaining equipment, such as oxygen, and medical personnel has been explained to me and I fully understand them  (JUSTINA CORRECT BOX BELOW)  [  ]  I consent to the stated transfer and to be transported by ambulance/helicopter  [  ]  I consent to the stated transfer, but refuse transportation by ambulance and accept full responsibility for my transportation by car  I understand the risks of non-ambulance transfers and I exonerate the Hospital and its staff from any deterioration in my condition that results from this refusal     X___________________________________________    DATE  18  TIME________  Signature of patient or legally responsible individual signing on patient behalf           RELATIONSHIP TO PATIENT_________________________          Provider Certification    NAME Millicent Henning                                         1985                              MRN [id number]    A medical screening exam was performed on the above named patient  Based on the examination:    Condition Necessitating Transfer The primary encounter diagnosis was Auditory hallucinations  A diagnosis of Suicidal ideations was also pertinent to this visit      Patient Condition: The patient has been stabilized such that within reasonable medical probability, no material deterioration of the patient condition or the condition of the unborn child(garrick) is likely to result from the transfer    Reason for Transfer: Level of Care needed not available at this facility    Transfer Requirements: [facility]   · Space available and qualified personnel available for treatment as acknowledged by    · Agreed to accept transfer and to provide appropriate medical treatment as acknowledged by       Dr Severo Coronado  · Appropriate medical records of the examination and treatment of the patient are provided at the time of transfer   [address] _______  · Transfer will be performed by qualified personnel from and appropriate transfer equipment as required, including the use of necessary and appropriate life support measures  Provider Certification: I have examined the patient and explained the following risks and benefits of being transferred/refusing transfer to the patient/family:  The patient is stable for psychiatric transfer because they are medically stable, and is protected from harming him/herself or others during transport      Based on these reasonable risks and benefits to the patient and/or the unborn child(garrick), and based upon the information available at the time of the patients examination, I certify that the medical benefits reasonably to be expected from the provision of appropriate medical treatments at another medical facility outweigh the increasing risks, if any, to the individuals medical condition, and in the case of labor to the unborn child, from effecting the transfer      X____________________________________________ DATE 06/24/18        TIME_______      ORIGINAL - SEND TO MEDICAL RECORDS   COPY - SEND WITH PATIENT DURING TRANSFER

## 2018-06-25 NOTE — ED NOTES
Pt observed sleeping, awaiting ambulance transport, remains on constant observation     Amanda Handy RN  06/24/18 0691

## 2018-06-25 NOTE — ED NOTES
[facility] here for patient, pt awakened to name, OOB to BR, gait steady   Paperwork and belongings given to EMT     Cuong Duarte, RN  06/25/18 3495

## 2018-06-26 NOTE — ED NOTES
6/26/18 @ 1030:  [facility] called , in an attempt to ship patient's belongings to [facility]   Catalino Howard  [address] [facility] [place], [address]: PES paged  at [phone]  [address] [facility] [place], [address]: Received return page; not correct pager #; called , who transferred to [address] [place] [address],[address] @ [phone], and informed PES that, "someone called yesterday and we told her to take belongings to store room to be shipped to [place], where they will be forwarded to patient "  PES will take belongings to storeroom for shipping    Crystal Hermason Escobedo, MS

## 2018-07-06 ENCOUNTER — APPOINTMENT (EMERGENCY)
Dept: RADIOLOGY | Facility: HOSPITAL | Age: 33
End: 2018-07-06
Payer: MEDICARE

## 2018-07-06 ENCOUNTER — HOSPITAL ENCOUNTER (EMERGENCY)
Facility: HOSPITAL | Age: 33
End: 2018-07-10
Attending: EMERGENCY MEDICINE | Admitting: EMERGENCY MEDICINE
Payer: MEDICARE

## 2018-07-06 DIAGNOSIS — F20.9 SCHIZOPHRENIA (HCC): Primary | ICD-10-CM

## 2018-07-06 LAB
ALBUMIN SERPL BCP-MCNC: 3.6 G/DL (ref 3.5–5)
ALP SERPL-CCNC: 49 U/L (ref 46–116)
ALT SERPL W P-5'-P-CCNC: 34 U/L (ref 12–78)
AMPHETAMINES SERPL QL SCN: NEGATIVE
ANION GAP SERPL CALCULATED.3IONS-SCNC: 8 MMOL/L (ref 4–13)
AST SERPL W P-5'-P-CCNC: 20 U/L (ref 5–45)
BARBITURATES UR QL: NEGATIVE
BASOPHILS # BLD AUTO: 0.03 THOUSANDS/ΜL (ref 0–0.1)
BASOPHILS NFR BLD AUTO: 0 % (ref 0–1)
BENZODIAZ UR QL: POSITIVE
BILIRUB SERPL-MCNC: 0.2 MG/DL (ref 0.2–1)
BILIRUB UR QL STRIP: NEGATIVE
BUN SERPL-MCNC: 2 MG/DL (ref 5–25)
CALCIUM SERPL-MCNC: 8.4 MG/DL (ref 8.3–10.1)
CHLORIDE SERPL-SCNC: 106 MMOL/L (ref 100–108)
CLARITY UR: CLEAR
CO2 SERPL-SCNC: 25 MMOL/L (ref 21–32)
COCAINE UR QL: NEGATIVE
COLOR UR: YELLOW
CREAT SERPL-MCNC: 0.9 MG/DL (ref 0.6–1.3)
EOSINOPHIL # BLD AUTO: 0.24 THOUSAND/ΜL (ref 0–0.61)
EOSINOPHIL NFR BLD AUTO: 3 % (ref 0–6)
ERYTHROCYTE [DISTWIDTH] IN BLOOD BY AUTOMATED COUNT: 13.4 % (ref 11.6–15.1)
ETHANOL SERPL-MCNC: <3 MG/DL (ref 0–3)
GFR SERPL CREATININE-BSD FRML MDRD: 110 ML/MIN/1.73SQ M
GLUCOSE SERPL-MCNC: 153 MG/DL (ref 65–140)
GLUCOSE UR STRIP-MCNC: NEGATIVE MG/DL
HCT VFR BLD AUTO: 41.9 % (ref 36.5–49.3)
HGB BLD-MCNC: 13.5 G/DL (ref 12–17)
HGB UR QL STRIP.AUTO: NEGATIVE
IMM GRANULOCYTES # BLD AUTO: 0.11 THOUSAND/UL (ref 0–0.2)
IMM GRANULOCYTES NFR BLD AUTO: 1 % (ref 0–2)
KETONES UR STRIP-MCNC: NEGATIVE MG/DL
LEUKOCYTE ESTERASE UR QL STRIP: NEGATIVE
LYMPHOCYTES # BLD AUTO: 1.99 THOUSANDS/ΜL (ref 0.6–4.47)
LYMPHOCYTES NFR BLD AUTO: 23 % (ref 14–44)
MCH RBC QN AUTO: 29.8 PG (ref 26.8–34.3)
MCHC RBC AUTO-ENTMCNC: 32.2 G/DL (ref 31.4–37.4)
MCV RBC AUTO: 93 FL (ref 82–98)
METHADONE UR QL: NEGATIVE
MONOCYTES # BLD AUTO: 0.65 THOUSAND/ΜL (ref 0.17–1.22)
MONOCYTES NFR BLD AUTO: 7 % (ref 4–12)
NEUTROPHILS # BLD AUTO: 5.78 THOUSANDS/ΜL (ref 1.85–7.62)
NEUTS SEG NFR BLD AUTO: 66 % (ref 43–75)
NITRITE UR QL STRIP: NEGATIVE
NRBC BLD AUTO-RTO: 0 /100 WBCS
OPIATES UR QL SCN: NEGATIVE
PCP UR QL: NEGATIVE
PH UR STRIP.AUTO: 7 [PH] (ref 5–9)
PLATELET # BLD AUTO: 272 THOUSANDS/UL (ref 149–390)
PMV BLD AUTO: 9.6 FL (ref 8.9–12.7)
POTASSIUM SERPL-SCNC: 3.5 MMOL/L (ref 3.5–5.3)
PROT SERPL-MCNC: 6.4 G/DL (ref 6.4–8.2)
PROT UR STRIP-MCNC: NEGATIVE MG/DL
RBC # BLD AUTO: 4.53 MILLION/UL (ref 3.88–5.62)
SODIUM SERPL-SCNC: 139 MMOL/L (ref 136–145)
SP GR UR STRIP.AUTO: <=1.005 (ref 1–1.03)
THC UR QL: POSITIVE
UROBILINOGEN UR QL STRIP.AUTO: 0.2 E.U./DL
WBC # BLD AUTO: 8.8 THOUSAND/UL (ref 4.31–10.16)

## 2018-07-06 PROCEDURE — 93005 ELECTROCARDIOGRAM TRACING: CPT

## 2018-07-06 PROCEDURE — 81003 URINALYSIS AUTO W/O SCOPE: CPT | Performed by: EMERGENCY MEDICINE

## 2018-07-06 PROCEDURE — 36415 COLL VENOUS BLD VENIPUNCTURE: CPT | Performed by: EMERGENCY MEDICINE

## 2018-07-06 PROCEDURE — 80053 COMPREHEN METABOLIC PANEL: CPT | Performed by: EMERGENCY MEDICINE

## 2018-07-06 PROCEDURE — 80307 DRUG TEST PRSMV CHEM ANLYZR: CPT | Performed by: EMERGENCY MEDICINE

## 2018-07-06 PROCEDURE — 85025 COMPLETE CBC W/AUTO DIFF WBC: CPT | Performed by: EMERGENCY MEDICINE

## 2018-07-06 PROCEDURE — 80320 DRUG SCREEN QUANTALCOHOLS: CPT | Performed by: EMERGENCY MEDICINE

## 2018-07-06 PROCEDURE — 96372 THER/PROPH/DIAG INJ SC/IM: CPT

## 2018-07-06 RX ORDER — LORAZEPAM 1 MG/1
1 TABLET ORAL ONCE
Status: COMPLETED | OUTPATIENT
Start: 2018-07-06 | End: 2018-07-06

## 2018-07-06 RX ORDER — HYDRALAZINE HYDROCHLORIDE 25 MG/1
50 TABLET, FILM COATED ORAL ONCE
Status: COMPLETED | OUTPATIENT
Start: 2018-07-06 | End: 2018-07-06

## 2018-07-06 RX ORDER — PREDNISONE 1 MG/1
1 TABLET ORAL 2 TIMES DAILY WITH MEALS
Status: DISCONTINUED | OUTPATIENT
Start: 2018-07-06 | End: 2018-07-07

## 2018-07-06 RX ORDER — IBUPROFEN 600 MG/1
600 TABLET ORAL ONCE
Status: COMPLETED | OUTPATIENT
Start: 2018-07-06 | End: 2018-07-06

## 2018-07-06 RX ORDER — LORAZEPAM 2 MG/ML
2 INJECTION INTRAMUSCULAR EVERY 6 HOURS PRN
Status: DISCONTINUED | OUTPATIENT
Start: 2018-07-06 | End: 2018-07-10 | Stop reason: HOSPADM

## 2018-07-06 RX ORDER — ALPRAZOLAM 0.25 MG/1
0.25 TABLET ORAL ONCE
Status: COMPLETED | OUTPATIENT
Start: 2018-07-06 | End: 2018-07-06

## 2018-07-06 RX ORDER — ALPRAZOLAM 0.5 MG/1
0.5 TABLET ORAL DAILY
COMMUNITY
End: 2018-07-07 | Stop reason: ALTCHOICE

## 2018-07-06 RX ORDER — HYDRALAZINE HYDROCHLORIDE 25 MG/1
50 TABLET, FILM COATED ORAL EVERY 8 HOURS SCHEDULED
Status: DISCONTINUED | OUTPATIENT
Start: 2018-07-06 | End: 2018-07-07

## 2018-07-06 RX ORDER — RISPERIDONE 2 MG/1
2 TABLET, FILM COATED ORAL
COMMUNITY
End: 2018-08-05

## 2018-07-06 RX ORDER — LORAZEPAM 2 MG/ML
2 INJECTION INTRAMUSCULAR ONCE
Status: COMPLETED | OUTPATIENT
Start: 2018-07-06 | End: 2018-07-06

## 2018-07-06 RX ORDER — OLANZAPINE 10 MG/1
5 INJECTION, POWDER, LYOPHILIZED, FOR SOLUTION INTRAMUSCULAR ONCE
Status: COMPLETED | OUTPATIENT
Start: 2018-07-06 | End: 2018-07-06

## 2018-07-06 RX ORDER — OLANZAPINE 10 MG/1
5 INJECTION, POWDER, LYOPHILIZED, FOR SOLUTION INTRAMUSCULAR EVERY 6 HOURS PRN
Status: DISCONTINUED | OUTPATIENT
Start: 2018-07-06 | End: 2018-07-10 | Stop reason: HOSPADM

## 2018-07-06 RX ORDER — NICOTINE 21 MG/24HR
21 PATCH, TRANSDERMAL 24 HOURS TRANSDERMAL ONCE
Status: DISCONTINUED | OUTPATIENT
Start: 2018-07-06 | End: 2018-07-08 | Stop reason: SDUPTHER

## 2018-07-06 RX ADMIN — IBUPROFEN 600 MG: 600 TABLET ORAL at 15:10

## 2018-07-06 RX ADMIN — OLANZAPINE 5 MG: 10 INJECTION, POWDER, FOR SOLUTION INTRAMUSCULAR at 19:00

## 2018-07-06 RX ADMIN — LORAZEPAM 1 MG: 1 TABLET ORAL at 14:27

## 2018-07-06 RX ADMIN — METOPROLOL TARTRATE 25 MG: 25 TABLET, FILM COATED ORAL at 15:11

## 2018-07-06 RX ADMIN — HYDRALAZINE HYDROCHLORIDE 50 MG: 25 TABLET, FILM COATED ORAL at 15:10

## 2018-07-06 RX ADMIN — ALPRAZOLAM 0.25 MG: 0.25 TABLET ORAL at 10:00

## 2018-07-06 RX ADMIN — LORAZEPAM 2 MG: 2 INJECTION INTRAMUSCULAR; INTRAVENOUS at 19:01

## 2018-07-06 NOTE — ED NOTES
Pt continues yelling and screaming in secured holding; demanding to see his ER attending (who is aware but busy with other pts)  Pt has been told repeatedly what he is waiting for but pt doesn't seem to get it  PES called [facility] @ 17:50 to alert them to the level opf agitation they should expect when doing the tele-psych - PES and security will need to be present at that time - spoke with Jitendra Walters  [facility] ? Jitendra Walters and John Borjas arrived in the ER about 18:20 and the tele-psych was completed and pt was committed  Pt was cooperative with IM Rx's post tele-psych; pt requested admission to [facility] / John Borjas was advised via phone @ 18:50  EKG faxed to [facility] @ 21:50  PC placed in pt's envelope  No beds found for pt tonight per [facility]

## 2018-07-06 NOTE — ED NOTES
7/6/18 @ 0745:  Patient placed in psych holding area, stating that he was discharged from hospital, and about a week later, he became depressed with SI  Patient said, "they changed my medication to Trileptal, and I don't think it's working "  Patient presents with slurred speech and unsteady gait  Patient was calm and cooperative  Patient was given iced decaffeinated coffee  PES will begin assessment shortly  26year-old male self presented to ED due to increased depression with suicidal thoughts, but no plan  Patient says, "I don't think my medication is working; I'm depressed and suicidal; my apartment makes noises; I'm hearing voices; Dr Ej Harris will not provide Risperdal IM, so he doesn't want to see me any more, and told me I need to get a new Psychiatrist "  Please see copy of crisis assessment for further details  Patient continues to decompensate in spite of recent hospitalization and visit with his psychiatrist 2 days ago  Patient is voluntary for admission  PES will begin bed search    Sandre Gaucher, MS

## 2018-07-06 NOTE — ED NOTES
Patient belongings    Eh Coffer with $17   Shorts x 2  Belt  Socks x 4 pairs  Gray Metal chain  Shirt x 6  Cell phone  Black Sneakers  Jeans x 2  Underwear x 3    All belongings in a total of 3 bags, labeled with patient sticker  Inventory witnessed by Victoria from [facility]       Theo Vergara RN  07/06/18 0637

## 2018-07-06 NOTE — ED NOTES
Leeann from [facility] called   Tele psych pushed back for approx another hr     Loulou Corral RN  07/06/18 8541

## 2018-07-06 NOTE — ED NOTES
Patient up frequently and ambulatory around secured holding area  Patient asking to have his door opened  Explained to patient that his room door is wide open - patient states "Oh " and ambulates back into room  Patient encouraged to lie down and get some sleep       Erin Lange RN  07/06/18 7442

## 2018-07-06 NOTE — ED NOTES
[facility] here for tele psych   Pt is committed     Pt is displaying aggressive behavior, and screaming out profanities      Shirley Elmore RN  07/06/18 7676

## 2018-07-06 NOTE — ED NOTES
Security and Dr Jake Roche accompanied this nurse into secured holding to medicate patient  Patient sat on side of bed  Dr Jake Roche spoke with him and patient was agreeable to take injections  Patient rolled up sleeves in order to facilitate  As staff was exiting, patient states "I hope you suck more yana  Go ahead and suck more yana " Continual observation remains in place       Stephanie Head RN  07/06/18 6917

## 2018-07-06 NOTE — ED PROVIDER NOTES
History  Chief Complaint   Patient presents with    Psychiatric Evaluation     patient c/o feeling depressed and having suicidal thoughts  states he has no specific plan but "I have been talking to the devil a lot "     Patient presents for evaluation of feeling depressed with suicidal thoughts  States been talking to the devil a lot  Also states thoughts of shooting himself with a gun  He doesn't have one but thinks he could get one  History provided by:  Patient   used: No    Psychiatric Evaluation   Presenting symptoms: hallucinations and suicidal thoughts        Prior to Admission Medications   Prescriptions Last Dose Informant Patient Reported? Taking?    ALPRAZolam (XANAX) 0 5 mg tablet   Yes Yes   Sig: Take 1 mg by mouth 2 (two) times a day     ALPRAZolam (XANAX) 0 5 mg tablet   Yes Yes   Sig: Take 0 5 mg by mouth daily In the afternoon   OXcarbazepine (TRILEPTAL PO) Unknown at Unknown time  Yes No   Sig: Take by mouth Unknown dose or frequency per patient   aspirin (ECOTRIN LOW STRENGTH) 81 mg EC tablet   Yes Yes   Sig: Take 81 mg by mouth daily   bisacodyl (DULCOLAX) 5 mg EC tablet   Yes Yes   Sig: Take 15 mg by mouth daily   hydrALAZINE (APRESOLINE) 50 mg tablet   Yes Yes   Sig: Take 50 mg by mouth 3 (three) times a day   metoprolol tartrate (LOPRESSOR) 50 mg tablet   Yes Yes   Sig: Take 25 mg by mouth every 12 (twelve) hours   predniSONE 1 mg tablet   Yes Yes   Sig: Take by mouth 2 (two) times a day with meals   risperiDONE (RisperDAL) 2 mg tablet   Yes Yes   Sig: Take 2 mg by mouth daily at bedtime   risperiDONE microspheres (RisperDAL CONSTA) 50 mg IM injection Past Week at Unknown time  Yes Yes   Sig: Inject 50 mg into a muscle every 14 (fourteen) days   zolpidem (AMBIEN CR) 12 5 MG CR tablet   Yes Yes   Sig: Take 10 mg by mouth daily at bedtime      Facility-Administered Medications: None       Past Medical History:   Diagnosis Date    Diabetes mellitus ([facility] [address] )     Hyperlipidemia     Hypertension     Psychiatric disorder     anxiety, depression, SI, hallucinating    Rapid heart rate        History reviewed  No pertinent surgical history  History reviewed  No pertinent family history  I have reviewed and agree with the history as documented  Social History   Substance Use Topics    Smoking status: Current Every Day Smoker     Packs/day: 0 50    Smokeless tobacco: Never Used    Alcohol use Yes        Review of Systems   Psychiatric/Behavioral: Positive for hallucinations and suicidal ideas  All other systems reviewed and are negative  Physical Exam  Physical Exam   Constitutional: He is oriented to person, place, and time  No distress  HENT:   Mouth/Throat: Oropharynx is clear and moist    Eyes: Pupils are equal, round, and reactive to light  Neck: Normal range of motion  Cardiovascular: Normal rate, regular rhythm and intact distal pulses  Pulmonary/Chest: Effort normal and breath sounds normal  No respiratory distress  Abdominal: Soft  There is no tenderness  Musculoskeletal: Normal range of motion  Neurological: He is alert and oriented to person, place, and time  Skin: Capillary refill takes less than 2 seconds  He is not diaphoretic  Nursing note and vitals reviewed        Vital Signs  ED Triage Vitals [07/06/18 0724]   Temperature Pulse Respirations Blood Pressure SpO2   98 5 °F (36 9 °C) 98 18 138/80 97 %      Temp Source Heart Rate Source Patient Position - Orthostatic VS BP Location FiO2 (%)   Tympanic Monitor Sitting Right arm --      Pain Score       No Pain           Vitals:    07/06/18 0724 07/06/18 1533   BP: 138/80 143/69   Pulse: 98 72   Patient Position - Orthostatic VS: Sitting Standing       Visual Acuity      ED Medications  Medications   nicotine (NICODERM CQ) 21 mg/24 hr TD 24 hr patch 21 mg (21 mg Transdermal Not Given 7/6/18 1000)   metoprolol tartrate (LOPRESSOR) tablet 25 mg (25 mg Oral Incomplete 7/6/18 1511) ALPRAZolam Barbara Elms) tablet 0 25 mg (0 25 mg Oral Given 7/6/18 1000)   LORazepam (ATIVAN) tablet 1 mg (1 mg Oral Given 7/6/18 1427)   ibuprofen (MOTRIN) tablet 600 mg (600 mg Oral Given 7/6/18 1510)   hydrALAZINE (APRESOLINE) tablet 50 mg (50 mg Oral Given 7/6/18 1510)       Diagnostic Studies  Results Reviewed     Procedure Component Value Units Date/Time    Rapid drug screen, urine [[id number]]  (Abnormal) Collected:  07/06/18 0807    Lab Status:  Final result Specimen:  Urine from Urine, Clean Catch Updated:  07/06/18 0858     Amph/Meth UR Negative     Barbiturate Ur Negative     Benzodiazepine Urine Positive (A)     Cocaine Urine Negative     Methadone Urine Negative     Opiate Urine Negative     PCP Ur Negative     THC Urine Positive (A)    Narrative:         Presumptive report  If requested, specimen will be sent to reference lab for confirmation  FOR MEDICAL PURPOSES ONLY  IF CONFIRMATION NEEDED PLEASE CONTACT THE LAB WITHIN 5 DAYS      Drug Screen Cutoff Levels:  AMPHETAMINE/METHAMPHETAMINES  1000 ng/mL  BARBITURATES     200 ng/mL  BENZODIAZEPINES     200 ng/mL  COCAINE      300 ng/mL  METHADONE      300 ng/mL  OPIATES      300 ng/mL  PHENCYCLIDINE     25 ng/mL  THC       50 ng/mL    Ethanol [[id number]]  (Normal) Collected:  07/06/18 0758    Lab Status:  Final result Specimen:  Blood from Hand, Left Updated:  07/06/18 0851     Ethanol Lvl <3 mg/dL     Comprehensive metabolic panel [[id number]]  (Abnormal) Collected:  07/06/18 0758    Lab Status:  Final result Specimen:  Blood from Hand, Left Updated:  07/06/18 0845     Sodium 139 mmol/L      Potassium 3 5 mmol/L      Chloride 106 mmol/L      CO2 25 mmol/L      Anion Gap 8 mmol/L      BUN 2 (L) mg/dL      Creatinine 0 90 mg/dL      Glucose 153 (H) mg/dL      Calcium 8 4 mg/dL      AST 20 U/L      ALT 34 U/L      Alkaline Phosphatase 49 U/L      Total Protein 6 4 g/dL      Albumin 3 6 g/dL      Total Bilirubin 0 20 mg/dL      eGFR 110 ml/min/1 73sq m Narrative:         National Kidney Disease Education Program recommendations are as follows:  GFR calculation is accurate only with a steady state creatinine  Chronic Kidney disease less than 60 ml/min/1 73 sq  meters  Kidney failure less than 15 ml/min/1 73 sq  meters  UA w Reflex to Microscopic [[id number]] Collected:  07/06/18 0807    Lab Status:  Final result Specimen:  Urine from Urine, Clean Catch Updated:  07/06/18 0839     Color, UA Yellow     Clarity, UA Clear     Specific Gravity, UA <=1 005     pH, UA 7 0     Leukocytes, UA Negative     Nitrite, UA Negative     Protein, UA Negative mg/dl      Glucose, UA Negative mg/dl      Ketones, UA Negative mg/dl      Urobilinogen, UA 0 2 E U /dl      Bilirubin, UA Negative     Blood, UA Negative    CBC and differential [[id number]] Collected:  07/06/18 0758    Lab Status:  Final result Specimen:  Blood from Hand, Left Updated:  07/06/18 0822     WBC 8 80 Thousand/uL      RBC 4 53 Million/uL      Hemoglobin 13 5 g/dL      Hematocrit 41 9 %      MCV 93 fL      MCH 29 8 pg      MCHC 32 2 g/dL      RDW 13 4 %      MPV 9 6 fL      Platelets 002 Thousands/uL      nRBC 0 /100 WBCs      Neutrophils Relative 66 %      Immat GRANS % 1 %      Lymphocytes Relative 23 %      Monocytes Relative 7 %      Eosinophils Relative 3 %      Basophils Relative 0 %      Neutrophils Absolute 5 78 Thousands/µL      Immature Grans Absolute 0 11 Thousand/uL      Lymphocytes Absolute 1 99 Thousands/µL      Monocytes Absolute 0 65 Thousand/µL      Eosinophils Absolute 0 24 Thousand/µL      Basophils Absolute 0 03 Thousands/µL                  XR chest 1 view portable    (Results Pending)              Procedures  Procedures       Phone Contacts  ED Phone Contact    ED Course  ED Course as of Jul 06 1644 Fri Jul 06, 2018   1458 Patient requesting percocet  However review [facility] shows he does not have this prescription  Ibuprofen ordered                                  MDM  Number of Diagnoses or Management Options  Diagnosis management comments: Pulse ox 98% on RA indicating adequate oxygenation    Crisis consulted and recommended screening the patient  Patient medically cleared for mental health evaluation and inpatient treatment as needed  Signed out to next provider pending telepsych  Amount and/or Complexity of Data Reviewed  Clinical lab tests: ordered and reviewed  Tests in the radiology section of CPT®: ordered and reviewed  Decide to obtain previous medical records or to obtain history from someone other than the patient: yes  Review and summarize past medical records: yes  Discuss the patient with other providers: yes    Patient Progress  Patient progress: stable    CritCare Time    Disposition  Final diagnoses:   None     ED Disposition     None      Follow-up Information    None         Patient's Medications   Discharge Prescriptions    No medications on file     No discharge procedures on file      ED Provider  Electronically Signed by           Laurel Farfan DO  07/06/18 7269

## 2018-07-06 NOTE — ED NOTES
Dr Manuel Mcdaniel in to see patient through the glass  Dr Manuel Mcdaniel attempted to calm the patient down  Attempts unsuccessful, pt continues to scream and yell  Pt has not been physically damaging to the environment or harming himself         Josefa Pond RN  07/06/18 5708

## 2018-07-06 NOTE — ED NOTES
Multiple attempts to calm the patient down by multiple staff members  Pt unable too be descalated        Elias Brooks RN  07/06/18 8462

## 2018-07-07 ENCOUNTER — APPOINTMENT (EMERGENCY)
Dept: RADIOLOGY | Facility: HOSPITAL | Age: 33
End: 2018-07-07
Payer: MEDICARE

## 2018-07-07 RX ORDER — LORATADINE 10 MG/1
10 TABLET ORAL ONCE
Status: COMPLETED | OUTPATIENT
Start: 2018-07-07 | End: 2018-07-07

## 2018-07-07 RX ORDER — HYDRALAZINE HYDROCHLORIDE 25 MG/1
50 TABLET, FILM COATED ORAL ONCE
Status: COMPLETED | OUTPATIENT
Start: 2018-07-07 | End: 2018-07-07

## 2018-07-07 RX ORDER — METFORMIN HYDROCHLORIDE EXTENDED-RELEASE TABLETS 1000 MG/1
1000 TABLET, FILM COATED, EXTENDED RELEASE ORAL
COMMUNITY

## 2018-07-07 RX ORDER — CHLORAL HYDRATE 500 MG
1000 CAPSULE ORAL DAILY
Status: DISCONTINUED | OUTPATIENT
Start: 2018-07-07 | End: 2018-07-10 | Stop reason: HOSPADM

## 2018-07-07 RX ORDER — METFORMIN HYDROCHLORIDE 500 MG/1
1000 TABLET, EXTENDED RELEASE ORAL
Status: DISCONTINUED | OUTPATIENT
Start: 2018-07-07 | End: 2018-07-10 | Stop reason: HOSPADM

## 2018-07-07 RX ORDER — COLESEVELAM 180 1/1
1875 TABLET ORAL 2 TIMES DAILY WITH MEALS
COMMUNITY

## 2018-07-07 RX ORDER — LORAZEPAM 1 MG/1
1 TABLET ORAL ONCE
Status: COMPLETED | OUTPATIENT
Start: 2018-07-07 | End: 2018-07-07

## 2018-07-07 RX ORDER — NAPROXEN 500 MG/1
250 TABLET ORAL 2 TIMES DAILY PRN
COMMUNITY
End: 2018-08-13

## 2018-07-07 RX ORDER — LORATADINE 10 MG/1
10 TABLET ORAL DAILY
COMMUNITY

## 2018-07-07 RX ORDER — ASPIRIN 81 MG/1
81 TABLET, CHEWABLE ORAL ONCE
Status: COMPLETED | OUTPATIENT
Start: 2018-07-07 | End: 2018-07-07

## 2018-07-07 RX ORDER — ALPRAZOLAM 0.5 MG/1
1 TABLET ORAL 2 TIMES DAILY
Status: DISCONTINUED | OUTPATIENT
Start: 2018-07-07 | End: 2018-07-08

## 2018-07-07 RX ORDER — SIMVASTATIN 20 MG
20 TABLET ORAL
COMMUNITY

## 2018-07-07 RX ORDER — METOPROLOL TARTRATE 50 MG/1
50 TABLET, FILM COATED ORAL EVERY 12 HOURS SCHEDULED
Status: DISCONTINUED | OUTPATIENT
Start: 2018-07-07 | End: 2018-07-10 | Stop reason: HOSPADM

## 2018-07-07 RX ORDER — MULTIVITAMIN
1 CAPSULE ORAL DAILY
COMMUNITY

## 2018-07-07 RX ORDER — RISPERIDONE 1 MG/1
2 TABLET, FILM COATED ORAL DAILY
Status: DISCONTINUED | OUTPATIENT
Start: 2018-07-07 | End: 2018-07-10 | Stop reason: HOSPADM

## 2018-07-07 RX ORDER — CHLORAL HYDRATE 500 MG
1000 CAPSULE ORAL DAILY
COMMUNITY

## 2018-07-07 RX ORDER — PRAVASTATIN SODIUM 20 MG
20 TABLET ORAL
Status: DISCONTINUED | OUTPATIENT
Start: 2018-07-07 | End: 2018-07-10 | Stop reason: HOSPADM

## 2018-07-07 RX ADMIN — RISPERIDONE 2 MG: 1 TABLET ORAL at 09:29

## 2018-07-07 RX ADMIN — LORATADINE 10 MG: 10 TABLET ORAL at 09:28

## 2018-07-07 RX ADMIN — METFORMIN HYDROCHLORIDE 1000 MG: 500 TABLET, EXTENDED RELEASE ORAL at 09:29

## 2018-07-07 RX ADMIN — HYDRALAZINE HYDROCHLORIDE 50 MG: 25 TABLET, FILM COATED ORAL at 06:25

## 2018-07-07 RX ADMIN — ALPRAZOLAM 1 MG: 0.5 TABLET ORAL at 09:28

## 2018-07-07 RX ADMIN — LORAZEPAM 1 MG: 1 TABLET ORAL at 06:25

## 2018-07-07 RX ADMIN — PRAVASTATIN SODIUM 20 MG: 20 TABLET ORAL at 17:58

## 2018-07-07 RX ADMIN — Medication 1000 MG: at 09:30

## 2018-07-07 RX ADMIN — ASPIRIN 81 MG 81 MG: 81 TABLET ORAL at 09:29

## 2018-07-07 RX ADMIN — ALPRAZOLAM 1 MG: 0.5 TABLET ORAL at 17:57

## 2018-07-07 RX ADMIN — METOPROLOL TARTRATE 50 MG: 50 TABLET ORAL at 20:49

## 2018-07-07 RX ADMIN — Medication 1 TABLET: at 09:28

## 2018-07-07 RX ADMIN — METOPROLOL TARTRATE 50 MG: 50 TABLET ORAL at 09:28

## 2018-07-07 NOTE — ED NOTES
Pt had requested anxiety meds  Was sleeping when meds due  Awakened and pt took meds       Marco Antonio Aponte RN  07/07/18 1210

## 2018-07-07 NOTE — ED NOTES
Called [facility]  Darrol Aase reports no beds available, still looking for placement      Kleber Hendricks

## 2018-07-07 NOTE — ED CARE HANDOFF
Emergency Department Sign Out Note        Sign out and transfer of care from Dr Jessika Le See Separate Emergency Department note  The patient, Marlyn Baltazar, was evaluated by the previous provider for psychiatric illness  Workup Completed:  Labs completed, medically clear, awaiting screening for psych    ED Course / Workup Pending (followup): Medically clear  patient was screened and committed involuntarily to psych facility  Shortly after this happened patient became very loud screaming yelling jumping on room police department was called as well as security to secure the area so the patient and staff could be safe for he was medicated patient complied did not resist at all let us medicate him so that he could be more comfortable and staff could remain safe  Procedures  MDM  CritCare Time      Disposition  Final diagnoses:   None     ED Disposition     None      Follow-up Information    None       Patient's Medications   Discharge Prescriptions    No medications on file     No discharge procedures on file         ED Provider  Electronically Signed by     Killian Solis DO  07/07/18 0129

## 2018-07-07 NOTE — ED NOTES
Pt continues to sleep, repositions self in bed, remains on constant observation     Shonda Lozoya, JOSE  07/06/18 3346

## 2018-07-07 NOTE — ED NOTES
Pt observed sleeping in bed, remains on constant observation     Antonella Buckner RN  07/06/18 2039

## 2018-07-07 NOTE — ED NOTES
Pt ate breakfast   Medications given  Pt cooperative and took all meds    Remains on 1:1      Hardy Baugh RN  07/07/18 8266

## 2018-07-07 NOTE — ED NOTES
Pt continues to sleep, repositions self in bed, remains on constant observation     Uri Fulton RN  07/07/18 9359

## 2018-07-07 NOTE — ED NOTES
Had received list of current medications as of 7/3/18 from [facility] through Dr Alivia Kaiser office, medication list updated   Dr Bradley Self aware     Kitty Orona, RN  07/07/18 9417

## 2018-07-07 NOTE — ED NOTES
Patient awake requesting medications ativan, and hydrazaline and beta blocker, patient medicated with ativan and hydrazaline, patient went back to bed     Wanda Armendariz RN  07/07/18 0086

## 2018-07-07 NOTE — ED NOTES
Pt continues to sleep, moves when disturbed but sleeping soundly  Vital signs obtained, will hold B/P meds   Dr Rigo Valentine RN  07/06/18 2344

## 2018-07-08 LAB
ATRIAL RATE: 73 BPM
P AXIS: 29 DEGREES
PR INTERVAL: 134 MS
QRS AXIS: 108 DEGREES
QRSD INTERVAL: 98 MS
QT INTERVAL: 420 MS
QTC INTERVAL: 462 MS
T WAVE AXIS: 20 DEGREES
VENTRICULAR RATE: 73 BPM

## 2018-07-08 PROCEDURE — 93010 ELECTROCARDIOGRAM REPORT: CPT | Performed by: INTERNAL MEDICINE

## 2018-07-08 RX ORDER — ASPIRIN 81 MG/1
81 TABLET, CHEWABLE ORAL ONCE
Status: COMPLETED | OUTPATIENT
Start: 2018-07-08 | End: 2018-07-08

## 2018-07-08 RX ORDER — ALPRAZOLAM 0.5 MG/1
1 TABLET ORAL 2 TIMES DAILY
Status: DISCONTINUED | OUTPATIENT
Start: 2018-07-08 | End: 2018-07-10 | Stop reason: HOSPADM

## 2018-07-08 RX ORDER — ALPRAZOLAM 0.5 MG/1
1 TABLET ORAL 2 TIMES DAILY
Status: DISCONTINUED | OUTPATIENT
Start: 2018-07-08 | End: 2018-07-08

## 2018-07-08 RX ORDER — ALPRAZOLAM 0.5 MG/1
0.5 TABLET ORAL
Status: DISCONTINUED | OUTPATIENT
Start: 2018-07-08 | End: 2018-07-10 | Stop reason: HOSPADM

## 2018-07-08 RX ORDER — NICOTINE 21 MG/24HR
21 PATCH, TRANSDERMAL 24 HOURS TRANSDERMAL ONCE
Status: COMPLETED | OUTPATIENT
Start: 2018-07-08 | End: 2018-07-09

## 2018-07-08 RX ADMIN — ASPIRIN 81 MG 81 MG: 81 TABLET ORAL at 14:06

## 2018-07-08 RX ADMIN — ALPRAZOLAM 1 MG: 0.5 TABLET ORAL at 08:52

## 2018-07-08 RX ADMIN — METOPROLOL TARTRATE 50 MG: 50 TABLET ORAL at 21:11

## 2018-07-08 RX ADMIN — ALPRAZOLAM 0.5 MG: 0.5 TABLET ORAL at 14:06

## 2018-07-08 RX ADMIN — PRAVASTATIN SODIUM 20 MG: 20 TABLET ORAL at 18:11

## 2018-07-08 RX ADMIN — Medication 1 TABLET: at 14:05

## 2018-07-08 RX ADMIN — Medication 1000 MG: at 08:53

## 2018-07-08 RX ADMIN — ALPRAZOLAM 1 MG: 0.5 TABLET ORAL at 21:12

## 2018-07-08 RX ADMIN — METFORMIN HYDROCHLORIDE 1000 MG: 500 TABLET, EXTENDED RELEASE ORAL at 08:52

## 2018-07-08 RX ADMIN — NICOTINE 21 MG: 21 PATCH, EXTENDED RELEASE TRANSDERMAL at 10:49

## 2018-07-08 RX ADMIN — RISPERIDONE 2 MG: 1 TABLET ORAL at 08:52

## 2018-07-08 RX ADMIN — METOPROLOL TARTRATE 50 MG: 50 TABLET ORAL at 08:53

## 2018-07-08 NOTE — ED NOTES
Pt agitated  Requesting clonidine patch that he says is on his list from [facility] but not on the list that [facility] sent to us  Dr Uma Harris aware        Tin Antonio, JOSE  07/08/18 0110

## 2018-07-08 NOTE — ED NOTES
Dr Sindi Rowe called  Asked to confirm med dose for xanax  He states that pt gets 1 mg bid and 0 5 mg in afternoon  Dr April Duffy aware       Reinaldo Potter, JOSE  07/08/18 7252

## 2018-07-08 NOTE — ED NOTES
Pt awake, requesting to use the bathroom and have something to eat  Boxed lunch given, pt used bathroom and is now watching TV in room   Remains on constant observation     Katty Barry RN  07/08/18 2371

## 2018-07-08 NOTE — ED NOTES
Pt observed sleeping, repositions self in bed, remains on constant observation     Uri Fulton RN  07/07/18 4362

## 2018-07-09 ENCOUNTER — APPOINTMENT (EMERGENCY)
Dept: RADIOLOGY | Facility: HOSPITAL | Age: 33
End: 2018-07-09
Payer: MEDICARE

## 2018-07-09 PROCEDURE — 96372 THER/PROPH/DIAG INJ SC/IM: CPT

## 2018-07-09 PROCEDURE — 71045 X-RAY EXAM CHEST 1 VIEW: CPT

## 2018-07-09 RX ORDER — LORAZEPAM 1 MG/1
1 TABLET ORAL ONCE
Status: COMPLETED | OUTPATIENT
Start: 2018-07-09 | End: 2018-07-09

## 2018-07-09 RX ORDER — CLONIDINE HYDROCHLORIDE 0.1 MG/1
0.1 TABLET ORAL ONCE
Status: COMPLETED | OUTPATIENT
Start: 2018-07-09 | End: 2018-07-09

## 2018-07-09 RX ADMIN — METOPROLOL TARTRATE 50 MG: 50 TABLET ORAL at 08:35

## 2018-07-09 RX ADMIN — METOPROLOL TARTRATE 50 MG: 50 TABLET ORAL at 20:41

## 2018-07-09 RX ADMIN — RISPERIDONE 2 MG: 1 TABLET ORAL at 08:35

## 2018-07-09 RX ADMIN — PRAVASTATIN SODIUM 20 MG: 20 TABLET ORAL at 16:16

## 2018-07-09 RX ADMIN — LORAZEPAM 1 MG: 1 TABLET ORAL at 04:39

## 2018-07-09 RX ADMIN — ALPRAZOLAM 1 MG: 0.5 TABLET ORAL at 08:36

## 2018-07-09 RX ADMIN — METFORMIN HYDROCHLORIDE 1000 MG: 500 TABLET, EXTENDED RELEASE ORAL at 08:48

## 2018-07-09 RX ADMIN — ALPRAZOLAM 0.5 MG: 0.5 TABLET ORAL at 13:39

## 2018-07-09 RX ADMIN — ALPRAZOLAM 1 MG: 0.5 TABLET ORAL at 21:02

## 2018-07-09 RX ADMIN — Medication 1 TABLET: at 08:36

## 2018-07-09 RX ADMIN — LORAZEPAM 2 MG: 2 INJECTION INTRAMUSCULAR; INTRAVENOUS at 16:39

## 2018-07-09 RX ADMIN — CLONIDINE HYDROCHLORIDE 0.1 MG: 0.1 TABLET ORAL at 03:53

## 2018-07-09 RX ADMIN — Medication 1000 MG: at 08:48

## 2018-07-09 NOTE — ED NOTES
7/9/18 @ 0910:  Received [facility] from [facility]; patient referred to central admissions for state admission, but patient has been out of [facility] x 2 months, so, "it's not considered a failed discharge "  [facility] will continue to bed search; patient is due to telepsych today  [address], [address]: Leonard Vides from [facility] arrived to complete telepsych    [address], [place]

## 2018-07-09 NOTE — ED NOTES
Pt awake and pacing, states he's having very bad nightmares and this is unusual for him  Pt states he normally takes Clonidine and doesn't get nightmares but we have not been giving it to him  Pt anxious, sitting on side of bed   Dr Romy Bradshaw aware, Clonidine ordered and given     Eileen Tran RN  07/09/18 1048

## 2018-07-09 NOTE — ED NOTES
Pt coming out to windows every 30-45 minutes asking what the time is  Pt walking around room  Pt cooperative, without complaints        [address], RN  07/09/18 7425

## 2018-07-09 NOTE — ED NOTES
Pt awake, requesting night time meds, states he's supposed to be on a clonidine patch but the updated med list that [facility] sent did not have it on it  Will ask doctor about med   Pt calm and cooperative at this time, remains on constant observation     James Salinas RN  07/08/18 0222

## 2018-07-09 NOTE — ED NOTES
Pt observed lying in bed, POA states pt is moving around frequently but is staying in bed,remains on constant observation     James Easton RN  07/09/18 0600

## 2018-07-09 NOTE — ED NOTES
Pt requesting additional food, called the menu line and they said he still could have 2 carbs   Grilled chicken sandwich ordered per pt request       Yamel Gaxiola RN  07/09/18 6729

## 2018-07-09 NOTE — ED NOTES
Assumed care of patient  Patient resting in bed  Respirations easy and unlabored no sign or symptoms of distress noted        Purnima Pedersen RN  07/09/18 5030

## 2018-07-09 NOTE — ED NOTES
Pt laying on bed, watching TV  No complaints at this time  Pt calm and cooperative        [address], RN  07/09/18 6629

## 2018-07-09 NOTE — ED NOTES
Pt requested to speak with PES - was just curious as to when his new tele-psych would be done - PES explained it is up to [facility] and how busy the are  About 15:45 [address] staff came to ER to complete telepsych  Pt was recommitted  [address] Segura called about 17:00 - she requested PES inform pt he was being re-admitted to [facility] and they were requesting the name of a family member pt would like at his hearing in 21 days - PES declined to do this and requested [facility] send staff to ER; pt's Rn was updated  83 Adriane Segura came to ER about 20:00 to speak with pt; PES accompanied her  Pt was upset with the news; reported he has already had 7 previous [facility] admissions and told them at d/c he would never be back  83 Adriane Segura called and asked PES if we knew if pt has a "guardian - his father" - on pt's face sheet it list no-one for emergency contact; PES suggested Alyssa Martinez check with [facility] as they would know and pt was just d/c'd from there after a 2 year stay in 5/18

## 2018-07-09 NOTE — ED NOTES
Pt continues to sleep, awakens occasionally to use the bathroom   Remains on constant observation     Daniel Pichardo RN  07/09/18 1919

## 2018-07-09 NOTE — ED NOTES
Pt still having difficulty relaxing, frequently out of bed walking in room, repositioning self in bed and rocking  Dr Brianna Escobar aware pt requested something for anxiety,ativan ordered and given  Remains on constant observation       Chante Worthy RN  07/09/18 0153

## 2018-07-09 NOTE — ED NOTES
Pt requesting to shower  Security called to assist   Pt also requesting PRN medication         [address], RN  07/09/18 6407

## 2018-07-10 VITALS
OXYGEN SATURATION: 97 % | WEIGHT: 230 LBS | HEART RATE: 89 BPM | DIASTOLIC BLOOD PRESSURE: 93 MMHG | BODY MASS INDEX: 30.34 KG/M2 | SYSTOLIC BLOOD PRESSURE: 140 MMHG | RESPIRATION RATE: 16 BRPM | TEMPERATURE: 96.9 F

## 2018-07-10 PROCEDURE — 96372 THER/PROPH/DIAG INJ SC/IM: CPT

## 2018-07-10 PROCEDURE — 99285 EMERGENCY DEPT VISIT HI MDM: CPT

## 2018-07-10 RX ORDER — NICOTINE 21 MG/24HR
21 PATCH, TRANSDERMAL 24 HOURS TRANSDERMAL DAILY
Status: DISCONTINUED | OUTPATIENT
Start: 2018-07-10 | End: 2018-07-10 | Stop reason: HOSPADM

## 2018-07-10 RX ORDER — LORAZEPAM 1 MG/1
2 TABLET ORAL ONCE
Status: COMPLETED | OUTPATIENT
Start: 2018-07-10 | End: 2018-07-10

## 2018-07-10 RX ADMIN — PRAVASTATIN SODIUM 20 MG: 20 TABLET ORAL at 17:14

## 2018-07-10 RX ADMIN — Medication 1 TABLET: at 08:59

## 2018-07-10 RX ADMIN — METFORMIN HYDROCHLORIDE 1000 MG: 500 TABLET, EXTENDED RELEASE ORAL at 07:57

## 2018-07-10 RX ADMIN — RISPERIDONE 2 MG: 1 TABLET ORAL at 08:57

## 2018-07-10 RX ADMIN — ALPRAZOLAM 1 MG: 0.5 TABLET ORAL at 08:59

## 2018-07-10 RX ADMIN — LORAZEPAM 2 MG: 2 INJECTION INTRAMUSCULAR; INTRAVENOUS at 10:22

## 2018-07-10 RX ADMIN — METOPROLOL TARTRATE 50 MG: 50 TABLET ORAL at 08:58

## 2018-07-10 RX ADMIN — LORAZEPAM 2 MG: 1 TABLET ORAL at 18:05

## 2018-07-10 RX ADMIN — ALPRAZOLAM 0.5 MG: 0.5 TABLET ORAL at 13:53

## 2018-07-10 RX ADMIN — Medication 1000 MG: at 09:16

## 2018-07-10 RX ADMIN — NICOTINE 21 MG: 21 PATCH, EXTENDED RELEASE TRANSDERMAL at 10:13

## 2018-07-10 NOTE — EMTALA/ACUTE CARE TRANSFER
[facility] EMERGENCY DEPARTMENT  [address] [facility] [address]  Dept: [phone]      EMTALA TRANSFER CONSENT    NAME Reggie Quintero                                         1985                              MRN [id number]    I have been informed of my rights regarding examination, treatment, and transfer   by Dr Kary Garcia MD    Benefits: Specialized equipment and/or services available at the receiving facility (Include comment)________________________    Risks: Potential for delay in receiving treatment, Potential deterioration of medical condition, Increased discomfort during transfer      { ED EMTALA TRANSFER CHOICES:[id number]}    I authorize the performance of emergency medical procedures and treatments upon me in both transit and upon arrival at the receiving facility  Additionally, I authorize the release of any and all medical records to the receiving facility and request they be transported with me, if possible  I understand that the safest mode of transportation during a medical emergency is an ambulance and that the Hospital advocates the use of this mode of transport  Risks of traveling to the receiving facility by car, including absence of medical control, life sustaining equipment, such as oxygen, and medical personnel has been explained to me and I fully understand them  (JUSTINA CORRECT BOX BELOW)  [  ]  I consent to the stated transfer and to be transported by ambulance/helicopter  [  ]  I consent to the stated transfer, but refuse transportation by ambulance and accept full responsibility for my transportation by car    I understand the risks of non-ambulance transfers and I exonerate the Hospital and its staff from any deterioration in my condition that results from this refusal     X___________________________________________    DATE  07/10/18  TIME________  Signature of patient or legally responsible individual signing on patient behalf RELATIONSHIP TO PATIENT_________________________          Provider Certification    NAME Carmina Trujillo                                         1985                              MRN [id number]    A medical screening exam was performed on the above named patient  Based on the examination:    Condition Necessitating Transfer The encounter diagnosis was Schizophrenia ([address] )  Patient Condition: The patient has been stabilized such that within reasonable medical probability, no material deterioration of the patient condition or the condition of the unborn child(garrick) is likely to result from the transfer    Reason for Transfer: Level of Care needed not available at this facility    Transfer Requirements: [address]   · Space available and qualified personnel available for treatment as acknowledged by    · Agreed to accept transfer and to provide appropriate medical treatment as acknowledged by       Dr Dav Strong  · Appropriate medical records of the examination and treatment of the patient are provided at the time of transfer   [address], [address] _______  · Transfer will be performed by qualified personnel from [facility]  and appropriate transfer equipment as required, including the use of necessary and appropriate life support measures      Provider Certification: I have examined the patient and explained the following risks and benefits of being transferred/refusing transfer to the patient/family:  General risk, such as traffic hazards, adverse weather conditions, rough terrain or turbulence, possible failure of equipment (including vehicle or aircraft), or consequences of actions of persons outside the control of the transport personnel, Unanticipated needs of medical equipment and personnel during transport, Risk of worsening condition, The possibility of a transport vehicle being unavailable, The patient is stable for psychiatric transfer because they are medically stable, and is protected from harming him/herself or others during transport      Based on these reasonable risks and benefits to the patient and/or the unborn child(garrick), and based upon the information available at the time of the patients examination, I certify that the medical benefits reasonably to be expected from the provision of appropriate medical treatments at another medical facility outweigh the increasing risks, if any, to the individuals medical condition, and in the case of labor to the unborn child, from effecting the transfer      X____________________________________________ DATE 07/10/18        TIME_______      ORIGINAL - SEND TO MEDICAL RECORDS   COPY - SEND WITH PATIENT DURING TRANSFER

## 2018-07-10 NOTE — ED NOTES
7/10/18 @ 0930:  PES updated patient that the plan was to leave today for [facility], that plans haven't been made yet, as [facility] is waiting for [facility]  [facility] [place], [address]: Received call from Diego at [facility], who reports that patient is accepted at [facility], but needs to be there by 1400; PES will call to set up transport  [place], [place]  1100: PES called [facility]:  Earliest  1900  Called [facility]:   @ 1300  PES updated Giuliano Bueno at [facility], who will contact [facility] and obtain accepting MD information  PES informed ED RN that [facility] needs MAR and med rec from  [facility] [place], [address]: Received phone call from Giuliano Bueno at [facility] stating that [facility] said, "we may not accept the patient, and that Dr Jed Wright is trying to get patient into [facility]; PES will cancel transport; called Lucita, who will contact [facility]  [facility], [address]: Giuliano Bueno from [facility] called to report that [facility] is declining patient  PES called Lucita to cancel   [place], [place]  1427: Patient accepted by Dr Susan Garcia at [address]; will set up transport  [address]: Called [facility]:   1900    [address], [place]

## 2018-07-10 NOTE — ED NOTES
Patient aware that he will be going to [facility]  Patient is angry but appriate at this time  Patient requesting his night time medications       Robin Sprague RN  07/09/18 2043

## 2018-07-10 NOTE — ED NOTES
Pt observed laying in bed, no distress noted  Observation maintained       Compa Floyd RN  07/09/18 9579

## 2018-07-10 NOTE — ED NOTES
Felix Bazan and [facility] continues to work on getting placement for patient  Patient cooperative  Ate all of lunch       Jason Tubbs RN  07/10/18 8789

## 2018-07-10 NOTE — ED NOTES
Care transferred to [facility] - [facility] SITE, [facility]  07/09/18 9366

## 2018-07-10 NOTE — ED NOTES
[facility] called to update and was accepted at [address] on the general adult unit  Nurse to nurse can be called at [phone]  Accepting physician can be obtained when report is called       Galo Hernandez RN  07/10/18 7791

## 2018-08-05 ENCOUNTER — HOSPITAL ENCOUNTER (EMERGENCY)
Facility: HOSPITAL | Age: 33
Discharge: HOME | End: 2018-08-05
Attending: EMERGENCY MEDICINE
Payer: MEDICARE

## 2018-08-05 ENCOUNTER — APPOINTMENT (EMERGENCY)
Dept: RADIOLOGY | Facility: HOSPITAL | Age: 33
End: 2018-08-05
Payer: MEDICARE

## 2018-08-05 VITALS
OXYGEN SATURATION: 98 % | SYSTOLIC BLOOD PRESSURE: 138 MMHG | HEART RATE: 64 BPM | WEIGHT: 240 LBS | DIASTOLIC BLOOD PRESSURE: 84 MMHG | BODY MASS INDEX: 31.66 KG/M2 | RESPIRATION RATE: 15 BRPM | TEMPERATURE: 97.7 F

## 2018-08-05 DIAGNOSIS — E87.6 HYPOKALEMIA: ICD-10-CM

## 2018-08-05 DIAGNOSIS — F41.9 ANXIETY: ICD-10-CM

## 2018-08-05 DIAGNOSIS — R44.1 VISUAL HALLUCINATIONS: ICD-10-CM

## 2018-08-05 DIAGNOSIS — Z91.14 NONCOMPLIANCE WITH MEDICATIONS: Primary | ICD-10-CM

## 2018-08-05 DIAGNOSIS — R07.9 CHEST PAIN: ICD-10-CM

## 2018-08-05 DIAGNOSIS — F19.10 SUBSTANCE ABUSE (HCC): ICD-10-CM

## 2018-08-05 LAB
ALBUMIN SERPL BCP-MCNC: 3.5 G/DL (ref 3.5–5)
ALP SERPL-CCNC: 55 U/L (ref 46–116)
ALT SERPL W P-5'-P-CCNC: 25 U/L (ref 12–78)
AMPHETAMINES SERPL QL SCN: NEGATIVE
ANION GAP SERPL CALCULATED.3IONS-SCNC: 7 MMOL/L (ref 4–13)
AST SERPL W P-5'-P-CCNC: 11 U/L (ref 5–45)
BARBITURATES UR QL: NEGATIVE
BASOPHILS # BLD AUTO: 0.02 THOUSANDS/ΜL (ref 0–0.1)
BASOPHILS NFR BLD AUTO: 0 % (ref 0–1)
BENZODIAZ UR QL: NEGATIVE
BILIRUB SERPL-MCNC: 0.3 MG/DL (ref 0.2–1)
BILIRUB UR QL STRIP: NEGATIVE
BUN SERPL-MCNC: 9 MG/DL (ref 5–25)
CALCIUM SERPL-MCNC: 9.1 MG/DL (ref 8.3–10.1)
CHLORIDE SERPL-SCNC: 101 MMOL/L (ref 100–108)
CLARITY UR: CLEAR
CO2 SERPL-SCNC: 27 MMOL/L (ref 21–32)
COCAINE UR QL: NEGATIVE
COLOR UR: COLORLESS
CREAT SERPL-MCNC: 0.86 MG/DL (ref 0.6–1.3)
EOSINOPHIL # BLD AUTO: 0.33 THOUSAND/ΜL (ref 0–0.61)
EOSINOPHIL NFR BLD AUTO: 4 % (ref 0–6)
ERYTHROCYTE [DISTWIDTH] IN BLOOD BY AUTOMATED COUNT: 12.4 % (ref 11.6–15.1)
ETHANOL SERPL-MCNC: <3 MG/DL (ref 0–3)
GFR SERPL CREATININE-BSD FRML MDRD: 112 ML/MIN/1.73SQ M
GLUCOSE SERPL-MCNC: 134 MG/DL (ref 65–140)
GLUCOSE UR STRIP-MCNC: NEGATIVE MG/DL
HCT VFR BLD AUTO: 38.6 % (ref 36.5–49.3)
HGB BLD-MCNC: 12.9 G/DL (ref 12–17)
HGB UR QL STRIP.AUTO: NEGATIVE
IMM GRANULOCYTES # BLD AUTO: 0.03 THOUSAND/UL (ref 0–0.2)
IMM GRANULOCYTES NFR BLD AUTO: 0 % (ref 0–2)
KETONES UR STRIP-MCNC: NEGATIVE MG/DL
LEUKOCYTE ESTERASE UR QL STRIP: NEGATIVE
LYMPHOCYTES # BLD AUTO: 2.53 THOUSANDS/ΜL (ref 0.6–4.47)
LYMPHOCYTES NFR BLD AUTO: 34 % (ref 14–44)
MCH RBC QN AUTO: 29.8 PG (ref 26.8–34.3)
MCHC RBC AUTO-ENTMCNC: 33.4 G/DL (ref 31.4–37.4)
MCV RBC AUTO: 89 FL (ref 82–98)
METHADONE UR QL: NEGATIVE
MONOCYTES # BLD AUTO: 0.68 THOUSAND/ΜL (ref 0.17–1.22)
MONOCYTES NFR BLD AUTO: 9 % (ref 4–12)
NEUTROPHILS # BLD AUTO: 3.89 THOUSANDS/ΜL (ref 1.85–7.62)
NEUTS SEG NFR BLD AUTO: 53 % (ref 43–75)
NITRITE UR QL STRIP: NEGATIVE
NRBC BLD AUTO-RTO: 0 /100 WBCS
OPIATES UR QL SCN: NEGATIVE
PCP UR QL: NEGATIVE
PH UR STRIP.AUTO: 7 [PH] (ref 5–9)
PLATELET # BLD AUTO: 269 THOUSANDS/UL (ref 149–390)
PMV BLD AUTO: 8.7 FL (ref 8.9–12.7)
POTASSIUM SERPL-SCNC: 3 MMOL/L (ref 3.5–5.3)
PROT SERPL-MCNC: 6.3 G/DL (ref 6.4–8.2)
PROT UR STRIP-MCNC: NEGATIVE MG/DL
RBC # BLD AUTO: 4.33 MILLION/UL (ref 3.88–5.62)
SODIUM SERPL-SCNC: 135 MMOL/L (ref 136–145)
SP GR UR STRIP.AUTO: <=1.005 (ref 1–1.03)
THC UR QL: NEGATIVE
TROPONIN I SERPL-MCNC: <0.02 NG/ML
UROBILINOGEN UR QL STRIP.AUTO: 0.2 E.U./DL
WBC # BLD AUTO: 7.48 THOUSAND/UL (ref 4.31–10.16)

## 2018-08-05 PROCEDURE — 71045 X-RAY EXAM CHEST 1 VIEW: CPT

## 2018-08-05 PROCEDURE — 80307 DRUG TEST PRSMV CHEM ANLYZR: CPT | Performed by: EMERGENCY MEDICINE

## 2018-08-05 PROCEDURE — 85025 COMPLETE CBC W/AUTO DIFF WBC: CPT | Performed by: EMERGENCY MEDICINE

## 2018-08-05 PROCEDURE — 81003 URINALYSIS AUTO W/O SCOPE: CPT | Performed by: EMERGENCY MEDICINE

## 2018-08-05 PROCEDURE — 80320 DRUG SCREEN QUANTALCOHOLS: CPT | Performed by: EMERGENCY MEDICINE

## 2018-08-05 PROCEDURE — 84484 ASSAY OF TROPONIN QUANT: CPT | Performed by: EMERGENCY MEDICINE

## 2018-08-05 PROCEDURE — 80053 COMPREHEN METABOLIC PANEL: CPT | Performed by: EMERGENCY MEDICINE

## 2018-08-05 PROCEDURE — 93005 ELECTROCARDIOGRAM TRACING: CPT

## 2018-08-05 PROCEDURE — 99284 EMERGENCY DEPT VISIT MOD MDM: CPT

## 2018-08-05 PROCEDURE — 36415 COLL VENOUS BLD VENIPUNCTURE: CPT | Performed by: EMERGENCY MEDICINE

## 2018-08-05 RX ORDER — ALPRAZOLAM 0.5 MG/1
0.5 TABLET ORAL 3 TIMES DAILY PRN
Qty: 10 TABLET | Refills: 0 | Status: SHIPPED | OUTPATIENT
Start: 2018-08-05

## 2018-08-05 RX ORDER — RISPERIDONE 2 MG/1
2 TABLET, FILM COATED ORAL DAILY
Qty: 15 TABLET | Refills: 0 | Status: SHIPPED | OUTPATIENT
Start: 2018-08-05 | End: 2018-08-20

## 2018-08-05 RX ORDER — POTASSIUM CHLORIDE 20 MEQ/1
40 TABLET, EXTENDED RELEASE ORAL ONCE
Status: COMPLETED | OUTPATIENT
Start: 2018-08-05 | End: 2018-08-05

## 2018-08-05 RX ADMIN — POTASSIUM CHLORIDE 40 MEQ: 1500 TABLET, EXTENDED RELEASE ORAL at 03:34

## 2018-08-05 NOTE — ED PROVIDER NOTES
History  Chief Complaint   Patient presents with    Headache     took drugs last 3days[ lani], out of xanax,risperdal for 24 hrs  sx's headache,neck pain " I do'nt feel right "    Psychiatric Evaluation     wants to see crisis     49-year-old polysubstance abuser admits to [facility] for the last 3 days  Patient has been out of his Xanax and risperdal for 1 day  Having withdrawal symptoms states he just does not feel well, has a headache and neck pain  With like to see crisis  Needs medication  Anxiety is height and and is going through his medication faster than usual         History provided by:  Patient  Headache   Pain location:  Generalized  Radiates to:  Does not radiate  Severity currently:  7/10  Severity at highest:  7/10  Onset quality:  Unable to specify  Duration:  2 days  Timing:  Constant  Progression:  Unchanged  Chronicity:  Recurrent  Similar to prior headaches: yes    Context: not caffeine, not loud noise and not straining    Relieved by:  Nothing  Worsened by:  Nothing  Ineffective treatments:  None tried  Associated symptoms: neck pain and photophobia    Associated symptoms: no abdominal pain, no blurred vision, no dizziness, no fever, no loss of balance, no nausea, no seizures and no syncope    Psychiatric Evaluation   Associated symptoms: headaches    Associated symptoms: no abdominal pain        Prior to Admission Medications   Prescriptions Last Dose Informant Patient Reported? Taking?    ALPRAZolam (XANAX) 0 5 mg tablet   Yes No   Sig: Take 1 mg by mouth 2 (two) times a day     Multiple Vitamin (MULTIVITAMIN) capsule   Yes No   Sig: Take 1 capsule by mouth daily   Omega-3 Fatty Acids (FISH OIL) 1,000 mg   Yes No   Sig: Take 1,000 mg by mouth daily   aspirin (ECOTRIN LOW STRENGTH) 81 mg EC tablet   Yes No   Sig: Take 81 mg by mouth daily   bisacodyl (DULCOLAX) 5 mg EC tablet   Yes No   Sig: Take 15 mg by mouth daily   colesevelam (WELCHOL) 625 mg tablet   Yes No   Sig: Take 1,875 mg by mouth 2 (two) times a day with meals   loratadine (CLARITIN) 10 mg tablet   Yes No   Sig: Take 10 mg by mouth daily   metFORMIN (FORTAMET) 1000 MG (OSM) 24 hr tablet   Yes No   Sig: Take 1,000 mg by mouth daily with breakfast   metoprolol tartrate (LOPRESSOR) 50 mg tablet   Yes No   Sig: Take 25 mg by mouth every 12 (twelve) hours   risperiDONE (RisperDAL) 2 mg tablet   Yes No   Sig: Take 2 mg by mouth daily at bedtime   simvastatin (ZOCOR) 20 mg tablet   Yes No   Sig: Take 20 mg by mouth daily at bedtime      Facility-Administered Medications: None       Past Medical History:   Diagnosis Date    Diabetes mellitus ([facility] [address] )     Hyperlipidemia     Hypertension     Psychiatric disorder     anxiety, depression, SI, hallucinating    Rapid heart rate        History reviewed  No pertinent surgical history  History reviewed  No pertinent family history  I have reviewed and agree with the history as documented  Social History   Substance Use Topics    Smoking status: Current Every Day Smoker     Packs/day: 0 50    Smokeless tobacco: Never Used    Alcohol use Yes        Review of Systems   Constitutional: Negative for chills and fever  HENT: Negative  Eyes: Positive for photophobia  Negative for blurred vision  Respiratory: Negative  Cardiovascular: Negative  Negative for syncope  Gastrointestinal: Negative for abdominal pain and nausea  Genitourinary: Negative  Musculoskeletal: Positive for neck pain  Skin: Negative  Neurological: Positive for headaches  Negative for dizziness, seizures and loss of balance  Hematological: Negative  Psychiatric/Behavioral: Negative  All other systems reviewed and are negative  Physical Exam  Physical Exam   Constitutional: He is oriented to person, place, and time  He appears well-developed and well-nourished  HENT:   Head: Normocephalic and atraumatic     Right Ear: External ear normal    Left Ear: External ear normal    Nose: Nose normal  Mouth/Throat: Oropharynx is clear and moist    Eyes: Conjunctivae and EOM are normal    Neck: Normal range of motion  Neck supple  Cardiovascular: Normal rate, regular rhythm, normal heart sounds and intact distal pulses  Pulmonary/Chest: Effort normal and breath sounds normal    Abdominal: Soft  Bowel sounds are normal    Musculoskeletal: Normal range of motion  Neurological: He is alert and oriented to person, place, and time  Skin: Skin is warm and dry  Capillary refill takes less than 2 seconds  Psychiatric: His speech is normal and behavior is normal  Judgment and thought content normal  His mood appears anxious  He is actively hallucinating  Cognition and memory are normal    Nursing note and vitals reviewed        Vital Signs  ED Triage Vitals [08/05/18 0221]   Temperature Pulse Respirations Blood Pressure SpO2   97 7 °F (36 5 °C) 64 16 (!) 170/102 97 %      Temp Source Heart Rate Source Patient Position - Orthostatic VS BP Location FiO2 (%)   Tympanic Monitor Lying -- --      Pain Score       Worst Possible Pain           Vitals:    08/05/18 0221 08/05/18 0258   BP: (!) 170/102 138/84   Pulse: 64 64   Patient Position - Orthostatic VS: Lying Lying       Visual Acuity      ED Medications  Medications   potassium chloride (K-DUR,KLOR-CON) CR tablet 40 mEq (40 mEq Oral Given 8/5/18 0334)       Diagnostic Studies  Results Reviewed     Procedure Component Value Units Date/Time    Troponin I [[id number]]  (Normal) Collected:  08/05/18 0251    Lab Status:  Final result Specimen:  Blood from Vein Updated:  08/05/18 0319     Troponin I <0 02 ng/mL     Rapid drug screen, urine [[id number]]  (Normal) Collected:  08/05/18 0257    Lab Status:  Final result Specimen:  Urine from Urine, Clean Catch Updated:  08/05/18 0319     Amph/Meth UR Negative     Barbiturate Ur Negative     Benzodiazepine Urine Negative     Cocaine Urine Negative     Methadone Urine Negative     Opiate Urine Negative     PCP Ur Negative THC Urine Negative    Narrative:         FOR MEDICAL PURPOSES ONLY  IF CONFIRMATION NEEDED PLEASE CONTACT THE LAB WITHIN 5 DAYS  Drug Screen Cutoff Levels:  AMPHETAMINE/METHAMPHETAMINES  1000 ng/mL  BARBITURATES     200 ng/mL  BENZODIAZEPINES     200 ng/mL  COCAINE      300 ng/mL  METHADONE      300 ng/mL  OPIATES      300 ng/mL  PHENCYCLIDINE     25 ng/mL  THC       50 ng/mL    Comprehensive metabolic panel [[id number]]  (Abnormal) Collected:  08/05/18 0251    Lab Status:  Final result Specimen:  Blood from Vein Updated:  08/05/18 0316     Sodium 135 (L) mmol/L      Potassium 3 0 (L) mmol/L      Chloride 101 mmol/L      CO2 27 mmol/L      Anion Gap 7 mmol/L      BUN 9 mg/dL      Creatinine 0 86 mg/dL      Glucose 134 mg/dL      Calcium 9 1 mg/dL      AST 11 U/L      ALT 25 U/L      Alkaline Phosphatase 55 U/L      Total Protein 6 3 (L) g/dL      Albumin 3 5 g/dL      Total Bilirubin 0 30 mg/dL      eGFR 112 ml/min/1 73sq m     Narrative:         National Kidney Disease Education Program recommendations are as follows:  GFR calculation is accurate only with a steady state creatinine  Chronic Kidney disease less than 60 ml/min/1 73 sq  meters  Kidney failure less than 15 ml/min/1 73 sq  meters      Ethanol [[id number]]  (Normal) Collected:  08/05/18 0251    Lab Status:  Final result Specimen:  Blood from Vein Updated:  08/05/18 0316     Ethanol Lvl <3 mg/dL     UA w Reflex to Microscopic w Reflex to Culture [[id number]] Collected:  08/05/18 0257    Lab Status:  Final result Specimen:  Urine from Urine, Clean Catch Updated:  08/05/18 0313     Color, UA Colorless     Clarity, UA Clear     Specific Gravity, UA <=1 005     pH, UA 7 0     Leukocytes, UA Negative     Nitrite, UA Negative     Protein, UA Negative mg/dl      Glucose, UA Negative mg/dl      Ketones, UA Negative mg/dl      Urobilinogen, UA 0 2 E U /dl      Bilirubin, UA Negative     Blood, UA Negative    CBC and differential [[id number]]  (Abnormal) Collected: 08/05/18 0251    Lab Status:  Final result Specimen:  Blood from Vein Updated:  08/05/18 0301     WBC 7 48 Thousand/uL      RBC 4 33 Million/uL      Hemoglobin 12 9 g/dL      Hematocrit 38 6 %      MCV 89 fL      MCH 29 8 pg      MCHC 33 4 g/dL      RDW 12 4 %      MPV 8 7 (L) fL      Platelets 978 Thousands/uL      nRBC 0 /100 WBCs      Neutrophils Relative 53 %      Immat GRANS % 0 %      Lymphocytes Relative 34 %      Monocytes Relative 9 %      Eosinophils Relative 4 %      Basophils Relative 0 %      Neutrophils Absolute 3 89 Thousands/µL      Immature Grans Absolute 0 03 Thousand/uL      Lymphocytes Absolute 2 53 Thousands/µL      Monocytes Absolute 0 68 Thousand/µL      Eosinophils Absolute 0 33 Thousand/µL      Basophils Absolute 0 02 Thousands/µL                  XR chest 1 view portable   ED Interpretation by Bre Pizarro MD (08/05 6906)   NAD      Final Result by Moo Corbett DO (08/05 8642)      No acute cardiopulmonary disease is seen            Workstation performed: [id number]                    Procedures  ECG 12 Lead Documentation  Date/Time: 8/5/2018 3:33 AM  Performed by: Jake Pineda  Authorized by: Jake Pineda     Indications / Diagnosis:  CP  ECG reviewed by me, the ED Provider: yes    Patient location:  ED  Previous ECG:     Comparison to cardiac monitor: Yes (NSR 65)    Interpretation:     Interpretation: normal    Rate:     ECG rate:  65    ECG rate assessment: normal    Rhythm:     Rhythm: sinus rhythm    Ectopy:     Ectopy: none    QRS:     QRS axis:  Right    QRS intervals:  Normal  Conduction:     Conduction: normal    ST segments:     ST segments:  Normal  T waves:     T waves: normal             Phone Contacts  ED Phone Contact    ED Course                               [facility]  CritCare Time    Disposition  Final diagnoses:   Noncompliance with medications   Visual hallucinations   Substance abuse   Chest pain   Anxiety   Hypokalemia     Time reflects when diagnosis was documented in both MDM as applicable and the Disposition within this note     Time User Action Codes Description Comment    8/5/2018  3:20 AM Cassi Beverage Add [Z91 14] Noncompliance with medications     8/5/2018  3:20 AM Cassi Beverage Add [R44 1] Visual hallucinations     8/5/2018  3:20 AM Casis Beverage Add [F19 10] Substance abuse     8/5/2018  3:20 AM Cassi Beverage Add [R07 9] Chest pain     8/5/2018  3:23 AM Cassi Beverage Add [F41 9] Anxiety     8/5/2018  3:32 AM Cassi Beverage Add [E87 6] Hypokalemia       ED Disposition     ED Disposition Condition Comment    Discharge  Glendia Prude discharge to home/self care      Condition at discharge: Stable        Follow-up Information     Follow up With Specialties Details Why Contact Info    [employer]  Schedule an appointment as soon as possible for a visit in 3 days  [phone]            Discharge Medication List as of 8/5/2018  3:32 AM      CONTINUE these medications which have CHANGED    Details   ALPRAZolam (XANAX) 0 5 mg tablet Take 1 tablet (0 5 mg total) by mouth 3 (three) times a day as needed for anxiety for up to 10 doses, Starting Sun 8/5/2018, Print      risperiDONE (RisperDAL) 2 mg tablet Take 1 tablet (2 mg total) by mouth daily for 15 days, Starting Sun 8/5/2018, Until Mon 8/20/2018, Print         CONTINUE these medications which have NOT CHANGED    Details   aspirin (ECOTRIN LOW STRENGTH) 81 mg EC tablet Take 81 mg by mouth daily, Historical Med      bisacodyl (DULCOLAX) 5 mg EC tablet Take 15 mg by mouth daily, Historical Med      colesevelam (WELCHOL) 625 mg tablet Take 1,875 mg by mouth 2 (two) times a day with meals, Historical Med      loratadine (CLARITIN) 10 mg tablet Take 10 mg by mouth daily, Historical Med      metFORMIN (FORTAMET) 1000 MG (OSM) 24 hr tablet Take 1,000 mg by mouth daily with breakfast, Historical Med      metoprolol tartrate (LOPRESSOR) 50 mg tablet Take 25 mg by mouth every 12 (twelve) hours, Historical Med      Multiple Vitamin (MULTIVITAMIN) capsule Take 1 capsule by mouth daily, Historical Med      Omega-3 Fatty Acids (FISH OIL) 1,000 mg Take 1,000 mg by mouth daily, Historical Med      simvastatin (ZOCOR) 20 mg tablet Take 20 mg by mouth daily at bedtime, Historical Med      naproxen (NAPROSYN) 500 mg tablet Take 250 mg by mouth 2 (two) times a day as needed for mild pain, Historical Med      risperiDONE microspheres (RisperDAL CONSTA) 50 mg IM injection Inject 50 mg into a muscle every 14 (fourteen) days, Historical Med           No discharge procedures on file      ED Provider  Electronically Signed by           Jose Syed MD  08/15/18 98

## 2018-08-05 NOTE — DISCHARGE INSTRUCTIONS
Anxiety   WHAT YOU SHOULD KNOW:   Anxiety is a condition that causes you to feel excessive worry, uneasiness, or fear  Family or work stress, smoking, caffeine, and alcohol can increase your risk for anxiety  Certain medicines or health conditions can also increase your risk  Anxiety may begin gradually, and can become a long-term condition if it is not managed or treated  AFTER YOU LEAVE:   Medicines:   · Medicines  can help you feel more calm and relaxed, and decrease your symptoms  · Take your medicine as directed  Contact your healthcare provider if you think your medicine is not helping or if you have side effects  Tell him if you are allergic to any medicine  Keep a list of the medicines, vitamins, and herbs you take  Include the amounts, and when and why you take them  Bring the list or the pill bottles to follow-up visits  Carry your medicine list with you in case of an emergency  Follow up with your healthcare provider within 2 weeks or as directed:  Write down your questions so you remember to ask them during your visits  Manage anxiety:   · Go to counseling as directed  Cognitive behavioral therapy can help you understand and change how you react to events that trigger your symptoms  · Find ways to manage your symptoms  Activities such as exercise, meditation, or listening to music can help you relax  · Practice deep breathing  Breathing can change how your body reacts to stress  Focus on taking slow, deep breaths several times a day, or during an anxiety attack  Breathe in through your nose, and out through your mouth  · Avoid caffeine  Caffeine can make your symptoms worse  Avoid foods or drinks that are meant to increase your energy level  · Limit or avoid alcohol  Ask your healthcare provider if alcohol is safe for you  You may not be able to drink alcohol if you take certain anxiety or depression medicines  Limit alcohol to 1 drink per day if you are a woman   Limit alcohol to 2 drinks per day if you are a man  A drink of alcohol is 12 ounces of beer, 5 ounces of wine, or 1½ ounces of liquor  Contact your healthcare provider if:   · Your symptoms get worse or do not get better with treatment  · You think your medicine may be causing side effects  · Your anxiety keeps you from doing your regular daily activities  · You have new symptoms since your last visit  · You have questions or concerns about your condition or care  Seek care immediately or call 911 if:   · You have chest pain, tightness, or heaviness that may spread to your shoulders, arms, jaw, neck, or back  · You feel like hurting yourself or someone else  · You feel dizzy, lightheaded, or faint  © 2014 [address] is for End User's use only and may not be sold, redistributed or otherwise used for commercial purposes  All illustrations and images included in CareNotes® are the copyrighted property of [employer] M [employer]  or [employer]  The above information is an  only  It is not intended as medical advice for individual conditions or treatments  Talk to your doctor, nurse or pharmacist before following any medical regimen to see if it is safe and effective for you  Chest Pain   WHAT YOU NEED TO KNOW:   Chest pain can be caused by a range of conditions, from not serious to life-threatening  Chest pain can be a symptom of a digestive problem, such as acid reflux or a stomach ulcer  An anxiety attack or a strong emotion, such as anger, can also cause chest pain  Infection, inflammation, or a fracture in the bones or cartilage in your chest can cause pain or discomfort  Sometimes chest pain or pressure is caused by poor blood flow to your heart (angina)  Chest pain may also be caused by life-threatening conditions such as a heart attack or blood clot in your lungs     DISCHARGE INSTRUCTIONS:   Call 911 if:   · You have any of the following signs of a heart attack:      ¨ Squeezing, pressure, or pain in your chest that lasts longer than 5 minutes or returns    ¨ Discomfort or pain in your back, neck, jaw, stomach, or arm     ¨ Trouble breathing    ¨ Nausea or vomiting    ¨ Lightheadedness or a sudden cold sweat, especially with chest pain or trouble breathing    Return to the emergency department if:   · You have chest discomfort that gets worse, even with medicine  · You cough or vomit blood  · Your bowel movements are black or bloody  · You cannot stop vomiting, or it hurts to swallow  Contact your healthcare provider if:   · You have questions or concerns about your condition or care  Medicines:   · Medicines  may be given to treat the cause of your chest pain  Examples include pain medicine, anxiety medicine, or medicines to increase blood flow to your heart  · Do not take certain medicines without asking your healthcare provider first   These include NSAIDs, herbal or vitamin supplements, or hormones (estrogen or progestin)  · Take your medicine as directed  Contact your healthcare provider if you think your medicine is not helping or if you have side effects  Tell him or her if you are allergic to any medicine  Keep a list of the medicines, vitamins, and herbs you take  Include the amounts, and when and why you take them  Bring the list or the pill bottles to follow-up visits  Carry your medicine list with you in case of an emergency  Follow up with your healthcare provider within 72 hours, or as directed: You may need to return for more tests to find the cause of your chest pain  You may be referred to a specialist, such as a cardiologist or gastroenterologist  Write down your questions so you remember to ask them during your visits  Healthy living tips: The following are general healthy guidelines  If your chest pain is caused by a heart problem, your healthcare provider will give you specific guidelines to follow  · Do not smoke  Nicotine and other chemicals in cigarettes and cigars can cause lung and heart damage  Ask your healthcare provider for information if you currently smoke and need help to quit  E-cigarettes or smokeless tobacco still contain nicotine  Talk to your healthcare provider before you use these products  · Eat a variety of healthy, low-fat foods  Healthy foods include fruits, vegetables, whole-grain breads, low-fat dairy products, beans, lean meats, and fish  Ask for more information about a heart healthy diet  · Ask about activity  Your healthcare provider will tell you which activities to limit or avoid  Ask when you can drive, return to work, and have sex  Ask about the best exercise plan for you  · Maintain a healthy weight  Ask your healthcare provider how much you should weigh  Ask him or her to help you create a weight loss plan if you are overweight  · Get the flu and pneumonia vaccines  All adults should get the influenza (flu) vaccine  Get it every year as soon as it becomes available  The pneumococcal vaccine is given to adults aged 72 years or older  The vaccine is given every 5 years to prevent pneumococcal disease, such as pneumonia  © 2017 [facility] [facility] Information is for End User's use only and may not be sold, redistributed or otherwise used for commercial purposes  All illustrations and images included in CareNotes® are the copyrighted property of A [employer] M , Inc  or [employer] Rockwell  The above information is an  only  It is not intended as medical advice for individual conditions or treatments  Talk to your doctor, nurse or pharmacist before following any medical regimen to see if it is safe and effective for you  Hallucinations   WHAT YOU NEED TO KNOW:   Hallucinations are things you see, hear, feel, taste, or smell that seem real but are not  Some hallucinations are temporary   Hallucinations that continue, interfere with daily activities, or worsen may be a sign of a serious medical or mental condition that needs treatment  DISCHARGE INSTRUCTIONS:   Call 911 if you or someone else notices any of the following:   · You want to harm yourself or someone else  · You hear voices telling you to harm yourself or someone else  · You have a seizure  · You are confused, do not know where you are, or are not making sense when you speak  Return to the emergency department if:   · Your hallucinations get worse  · You vomit several times in a row  · Your heartbeat or breathing is faster or slower than usual      · You have trouble breathing or shortness of breath  Contact your healthcare provider if:   · You have new hallucinations  · You have questions or concerns about your condition or care  Medicines:   · Medicines  may be given to stop the hallucinations, reduce anxiety, or relax your muscles  · Take your medicine as directed  Contact your healthcare provider if you think your medicine is not helping or if you have side effects  Tell him or her if you are allergic to any medicine  Keep a list of the medicines, vitamins, and herbs you take  Include the amounts, and when and why you take them  Bring the list or the pill bottles to follow-up visits  Carry your medicine list with you in case of an emergency  Follow up with your healthcare provider as directed:  Write down your questions so you remember to ask them during your visits  © 2017 [address] [employer] Information is for End User's use only and may not be sold, redistributed or otherwise used for commercial purposes  All illustrations and images included in CareNotes® are the copyrighted property of A D [employer] M , Inc  or [employer] Rock[employer]  The above information is an  only  It is not intended as medical advice for individual conditions or treatments   Talk to your doctor, nurse or pharmacist before following any medical regimen to see if it is safe and effective for you  Polysubstance Abuse   WHAT YOU NEED TO KNOW:   Polysubstance abuse is the abuse of 2 or more drugs that cause impairment or distress  Examples include alcohol, nicotine, marijuana, cocaine, heroin, methamphetamine, hallucinogens such as mushrooms, or inhalants such as paint thinner  Prescribed medicines, such as opioids for pain or benzodiazepines for anxiety, are also commonly abused  DISCHARGE INSTRUCTIONS:   Call 911 for any of the following:   · You feel you might harm yourself or others  Return to the emergency department if:   · You have a seizure  · You have chest pain and your heart is beating faster than usual      · You have new shortness of breath  · You are dizzy and lightheaded  Contact your healthcare provider or therapist if:   · You are using drugs and think you are pregnant  · You have withdrawal symptoms and want to start using drugs again  · You have questions or concerns about your condition or care  Risks of polysubstance abuse:   · Drug dependence  is when you continue to use drugs, even when you know the risks  Polysubstance abuse can damage your heart, brain, lungs, liver, and gastrointestinal tract  You continue even when it causes problems with work, school, or relationships  You may have difficulty finding or keeping a job because of your drug dependence  · Drug tolerance  is when you need to use more drugs, or use them more often, to get the effects you want  You may not be able to stop using the drugs  When you try to stop, you may have withdrawal symptoms and strong cravings for the drugs  · Drug overdose  can occur when you take more drugs than your body can handle  This may be a small amount or a large amount  You can lose consciousness or have a seizure or stroke  Your heart can stop beating, or you can stop breathing  You may die from a drug overdose    Medicines:   · Withdrawal medicines  may be given according to the types of drugs you are abusing  Withdrawal from drugs can cause serious, life-threatening side effects  Certain medicines can help decrease your withdrawal symptoms and your desire for the drug  Ask for more information about the withdrawal medicines you may need  · Mood stabilizers  may be given to help prevent or treat depression or anxiety caused by drug abuse and withdrawal      · Take your medicine as directed  Contact your healthcare provider if you think your medicine is not helping or if you have side effects  Tell him or her if you are allergic to any medicine  Keep a list of the medicines, vitamins, and herbs you take  Include the amounts, and when and why you take them  Bring the list or the pill bottles to follow-up visits  Carry your medicine list with you in case of an emergency  Follow up with your healthcare provider as directed: You may be referred to a specialist to treat health conditions caused by your drug use  This includes mental health, heart, or lung specialists  Write down your questions so you remember to ask them during your visits  Therapy:  You may need therapy and support to stop using drugs:  · Cognitive and behavioral therapy  helps you change your thinking and behavior  It can help you develop plans to avoid the situations that make you want to use drugs  It also helps you cope with the feelings of wanting to use drugs  You may have individual or group therapy  · Contingency management  helps you set drug-free goals with a therapist  Radha Mckenna will decide ways to celebrate your success when you reach a goal      · Family therapy and support groups  allow you and your family members to talk to and be encouraged by other people affected by drug abuse  You and your family members may attend together or separately  Ask your healthcare provider for information about programs in your area    How polysubstance abuse affects unborn or  babies:   · If you are pregnant or get pregnant while using drugs, you may have a miscarriage or give birth early  Your baby may be born addicted to the drugs  · Do not breastfeed your baby if you use drugs  Drugs pass from your bloodstream into your breast milk and affect your baby's health  Talk with your healthcare provider if you are using drugs and breastfeeding  For support and more information:   · Alcoholics Anonymous  Web Address: http://AfterYes/  · APRIL Zaragoza on Drug and Alcohol Information  Phone: 9- 761 - 1339743  Web Address: [employer]  · [employer] on Alcoholism and Drug Dependence  Rodo Leung [address] [address]  Phone: 8- 551 - [phone]  Phone: 7- 371 - [phone]  Web Address: [employer] br  org  © 2017 [address] Information is for End User's use only and may not be sold, redistributed or otherwise used for commercial purposes  All illustrations and images included in CareNotes® are the copyrighted property of [employer] M [employer] Inc  or Rom Rockwell  The above information is an  only  It is not intended as medical advice for individual conditions or treatments  Talk to your doctor, nurse or pharmacist before following any medical regimen to see if it is safe and effective for you

## 2018-08-07 LAB
ATRIAL RATE: 65 BPM
P AXIS: 26 DEGREES
PR INTERVAL: 132 MS
QRS AXIS: 104 DEGREES
QRSD INTERVAL: 100 MS
QT INTERVAL: 424 MS
QTC INTERVAL: 440 MS
T WAVE AXIS: 54 DEGREES
VENTRICULAR RATE: 65 BPM

## 2018-08-07 PROCEDURE — 93010 ELECTROCARDIOGRAM REPORT: CPT | Performed by: INTERNAL MEDICINE

## 2018-08-11 ENCOUNTER — HOSPITAL ENCOUNTER (EMERGENCY)
Facility: HOSPITAL | Age: 33
End: 2018-08-14
Attending: EMERGENCY MEDICINE | Admitting: EMERGENCY MEDICINE
Payer: MEDICARE

## 2018-08-11 DIAGNOSIS — F20.9 SCHIZOPHRENIA (HCC): Primary | ICD-10-CM

## 2018-08-11 LAB
ALBUMIN SERPL BCP-MCNC: 3.7 G/DL (ref 3.5–5)
ALP SERPL-CCNC: 65 U/L (ref 46–116)
ALT SERPL W P-5'-P-CCNC: 19 U/L (ref 12–78)
AMPHETAMINES SERPL QL SCN: NEGATIVE
ANION GAP SERPL CALCULATED.3IONS-SCNC: 8 MMOL/L (ref 4–13)
AST SERPL W P-5'-P-CCNC: 9 U/L (ref 5–45)
BARBITURATES UR QL: NEGATIVE
BASOPHILS # BLD AUTO: 0.03 THOUSANDS/ΜL (ref 0–0.1)
BASOPHILS NFR BLD AUTO: 0 % (ref 0–1)
BENZODIAZ UR QL: NEGATIVE
BILIRUB SERPL-MCNC: 0.3 MG/DL (ref 0.2–1)
BILIRUB UR QL STRIP: NEGATIVE
BUN SERPL-MCNC: 13 MG/DL (ref 5–25)
CALCIUM SERPL-MCNC: 8.5 MG/DL (ref 8.3–10.1)
CHLORIDE SERPL-SCNC: 99 MMOL/L (ref 100–108)
CLARITY UR: CLEAR
CO2 SERPL-SCNC: 27 MMOL/L (ref 21–32)
COCAINE UR QL: NEGATIVE
COLOR UR: NORMAL
CREAT SERPL-MCNC: 1.11 MG/DL (ref 0.6–1.3)
EOSINOPHIL # BLD AUTO: 0.27 THOUSAND/ΜL (ref 0–0.61)
EOSINOPHIL NFR BLD AUTO: 3 % (ref 0–6)
ERYTHROCYTE [DISTWIDTH] IN BLOOD BY AUTOMATED COUNT: 12.2 % (ref 11.6–15.1)
GFR SERPL CREATININE-BSD FRML MDRD: 86 ML/MIN/1.73SQ M
GLUCOSE SERPL-MCNC: 202 MG/DL (ref 65–140)
GLUCOSE UR STRIP-MCNC: NEGATIVE MG/DL
HCT VFR BLD AUTO: 43.6 % (ref 36.5–49.3)
HGB BLD-MCNC: 14.6 G/DL (ref 12–17)
HGB UR QL STRIP.AUTO: NEGATIVE
IMM GRANULOCYTES # BLD AUTO: 0.03 THOUSAND/UL (ref 0–0.2)
IMM GRANULOCYTES NFR BLD AUTO: 0 % (ref 0–2)
KETONES UR STRIP-MCNC: NEGATIVE MG/DL
LEUKOCYTE ESTERASE UR QL STRIP: NEGATIVE
LYMPHOCYTES # BLD AUTO: 3.37 THOUSANDS/ΜL (ref 0.6–4.47)
LYMPHOCYTES NFR BLD AUTO: 32 % (ref 14–44)
MCH RBC QN AUTO: 30 PG (ref 26.8–34.3)
MCHC RBC AUTO-ENTMCNC: 33.5 G/DL (ref 31.4–37.4)
MCV RBC AUTO: 90 FL (ref 82–98)
METHADONE UR QL: NEGATIVE
MONOCYTES # BLD AUTO: 0.63 THOUSAND/ΜL (ref 0.17–1.22)
MONOCYTES NFR BLD AUTO: 6 % (ref 4–12)
NEUTROPHILS # BLD AUTO: 6.09 THOUSANDS/ΜL (ref 1.85–7.62)
NEUTS SEG NFR BLD AUTO: 59 % (ref 43–75)
NITRITE UR QL STRIP: NEGATIVE
NRBC BLD AUTO-RTO: 0 /100 WBCS
OPIATES UR QL SCN: NEGATIVE
PCP UR QL: NEGATIVE
PH UR STRIP.AUTO: 7 [PH] (ref 5–9)
PLATELET # BLD AUTO: 332 THOUSANDS/UL (ref 149–390)
PMV BLD AUTO: 8.9 FL (ref 8.9–12.7)
POTASSIUM SERPL-SCNC: 3.5 MMOL/L (ref 3.5–5.3)
PROT SERPL-MCNC: 6.8 G/DL (ref 6.4–8.2)
PROT UR STRIP-MCNC: NEGATIVE MG/DL
RBC # BLD AUTO: 4.86 MILLION/UL (ref 3.88–5.62)
SODIUM SERPL-SCNC: 134 MMOL/L (ref 136–145)
SP GR UR STRIP.AUTO: <=1.005 (ref 1–1.03)
THC UR QL: NEGATIVE
UROBILINOGEN UR QL STRIP.AUTO: 0.2 E.U./DL
WBC # BLD AUTO: 10.42 THOUSAND/UL (ref 4.31–10.16)

## 2018-08-11 PROCEDURE — 80307 DRUG TEST PRSMV CHEM ANLYZR: CPT | Performed by: EMERGENCY MEDICINE

## 2018-08-11 PROCEDURE — 85025 COMPLETE CBC W/AUTO DIFF WBC: CPT | Performed by: EMERGENCY MEDICINE

## 2018-08-11 PROCEDURE — 80053 COMPREHEN METABOLIC PANEL: CPT | Performed by: EMERGENCY MEDICINE

## 2018-08-11 PROCEDURE — 36415 COLL VENOUS BLD VENIPUNCTURE: CPT | Performed by: EMERGENCY MEDICINE

## 2018-08-11 PROCEDURE — 81003 URINALYSIS AUTO W/O SCOPE: CPT | Performed by: EMERGENCY MEDICINE

## 2018-08-11 RX ORDER — ALPRAZOLAM 0.5 MG/1
0.5 TABLET ORAL ONCE
Status: COMPLETED | OUTPATIENT
Start: 2018-08-11 | End: 2018-08-11

## 2018-08-11 RX ADMIN — ALPRAZOLAM 0.5 MG: 0.5 TABLET ORAL at 19:20

## 2018-08-11 RX ADMIN — ALPRAZOLAM 0.5 MG: 0.5 TABLET ORAL at 11:40

## 2018-08-11 NOTE — ED NOTES
Patient moved to room 21  Patient ambulatory with steady gait  Remains calm and cooperative  States he does "feel better"  Patient used the restroom  Patient returned to room       Emil Simeon RN  08/11/18 8909

## 2018-08-11 NOTE — ED PROVIDER NOTES
History  Chief Complaint   Patient presents with    Psychiatric Evaluation      Pt states"I am suicidal " denies plan,hearing voices  Pt states "I'm not on drugs tonight "     Pt in ER with c/o suicidal ideations  He states that he is having both auditory and visual hallucinations similar to when he uses "lani", but last use was a few weeks ago  He denies HI  Pt denies a suicide attempt today, but has attempted in the past by taking all his meds            Prior to Admission Medications   Prescriptions Last Dose Informant Patient Reported? Taking?    ALPRAZolam (XANAX) 0 5 mg tablet   No No   Sig: Take 1 tablet (0 5 mg total) by mouth 3 (three) times a day as needed for anxiety for up to 10 doses   Multiple Vitamin (MULTIVITAMIN) capsule   Yes No   Sig: Take 1 capsule by mouth daily   Omega-3 Fatty Acids (FISH OIL) 1,000 mg   Yes No   Sig: Take 1,000 mg by mouth daily   aspirin (ECOTRIN LOW STRENGTH) 81 mg EC tablet   Yes No   Sig: Take 81 mg by mouth daily   bisacodyl (DULCOLAX) 5 mg EC tablet   Yes No   Sig: Take 15 mg by mouth daily   colesevelam (WELCHOL) 625 mg tablet   Yes No   Sig: Take 1,875 mg by mouth 2 (two) times a day with meals   loratadine (CLARITIN) 10 mg tablet   Yes No   Sig: Take 10 mg by mouth daily   metFORMIN (FORTAMET) 1000 MG (OSM) 24 hr tablet   Yes No   Sig: Take 1,000 mg by mouth daily with breakfast   metoprolol tartrate (LOPRESSOR) 50 mg tablet   Yes No   Sig: Take 25 mg by mouth every 12 (twelve) hours   naproxen (NAPROSYN) 500 mg tablet   Yes No   Sig: Take 250 mg by mouth 2 (two) times a day as needed for mild pain   risperiDONE (RisperDAL) 2 mg tablet   No No   Sig: Take 1 tablet (2 mg total) by mouth daily for 15 days   simvastatin (ZOCOR) 20 mg tablet   Yes No   Sig: Take 20 mg by mouth daily at bedtime      Facility-Administered Medications: None       Past Medical History:   Diagnosis Date    Diabetes mellitus ([address] )     Hyperlipidemia     Hypertension     Psychiatric disorder     anxiety, depression, SI, hallucinating    Rapid heart rate        History reviewed  No pertinent surgical history  History reviewed  No pertinent family history  I have reviewed and agree with the history as documented  Social History   Substance Use Topics    Smoking status: Current Every Day Smoker     Packs/day: 0 50    Smokeless tobacco: Never Used    Alcohol use Yes        Review of Systems   Constitutional: Negative for chills and fever  Psychiatric/Behavioral: Positive for behavioral problems, hallucinations and suicidal ideas  Negative for agitation and self-injury  All other systems reviewed and are negative  Physical Exam  Physical Exam   Constitutional: He is oriented to person, place, and time  He appears well-developed and well-nourished  HENT:   Head: Normocephalic and atraumatic  Eyes: EOM are normal  Pupils are equal, round, and reactive to light  Cardiovascular: Normal rate, regular rhythm and normal heart sounds  Pulmonary/Chest: Effort normal and breath sounds normal    Abdominal: Soft  Bowel sounds are normal  He exhibits no distension and no mass  There is no tenderness  There is no rebound and no guarding  Neurological: He is alert and oriented to person, place, and time  Skin: Skin is warm and dry  Psychiatric: His affect is blunt  He exhibits a depressed mood  He expresses suicidal ideation  He expresses no homicidal ideation  He expresses no suicidal plans and no homicidal plans  Nursing note and vitals reviewed        Vital Signs  ED Triage Vitals [08/11/18 0449]   Temperature Pulse Respirations Blood Pressure SpO2   (!) 97 4 °F (36 3 °C) 80 15 (!) 172/98 98 %      Temp Source Heart Rate Source Patient Position - Orthostatic VS BP Location FiO2 (%)   Tympanic Monitor Sitting -- --      Pain Score       --           Vitals:    08/11/18 0449   BP: (!) 172/98   Pulse: 80   Patient Position - Orthostatic VS: Sitting       Visual Acuity      ED Medications  Medications - No data to display    Diagnostic Studies  Results Reviewed     Procedure Component Value Units Date/Time    Comprehensive metabolic panel [[id number]]  (Abnormal) Collected:  08/11/18 0511    Lab Status:  Final result Specimen:  Blood from Vein Updated:  08/11/18 0554     Sodium 134 (L) mmol/L      Potassium 3 5 mmol/L      Chloride 99 (L) mmol/L      CO2 27 mmol/L      Anion Gap 8 mmol/L      BUN 13 mg/dL      Creatinine 1 11 mg/dL      Glucose 202 (H) mg/dL      Calcium 8 5 mg/dL      AST 9 U/L      ALT 19 U/L      Alkaline Phosphatase 65 U/L      Total Protein 6 8 g/dL      Albumin 3 7 g/dL      Total Bilirubin 0 30 mg/dL      eGFR 86 ml/min/1 73sq m     Narrative:         National Kidney Disease Education Program recommendations are as follows:  GFR calculation is accurate only with a steady state creatinine  Chronic Kidney disease less than 60 ml/min/1 73 sq  meters  Kidney failure less than 15 ml/min/1 73 sq  meters  Rapid drug screen, urine [[id number]]  (Normal) Collected:  08/11/18 0523    Lab Status:  Final result Specimen:  Urine from Urine, Clean Catch Updated:  08/11/18 0550     Amph/Meth UR Negative     Barbiturate Ur Negative     Benzodiazepine Urine Negative     Cocaine Urine Negative     Methadone Urine Negative     Opiate Urine Negative     PCP Ur Negative     THC Urine Negative    Narrative:         FOR MEDICAL PURPOSES ONLY  IF CONFIRMATION NEEDED PLEASE CONTACT THE LAB WITHIN 5 DAYS      Drug Screen Cutoff Levels:  AMPHETAMINE/METHAMPHETAMINES  1000 ng/mL  BARBITURATES     200 ng/mL  BENZODIAZEPINES     200 ng/mL  COCAINE      300 ng/mL  METHADONE      300 ng/mL  OPIATES      300 ng/mL  PHENCYCLIDINE     25 ng/mL  THC       50 ng/mL    UA w Reflex to Microscopic w Reflex to Culture [[id number]] Collected:  08/11/18 0523    Lab Status:  Final result Specimen:  Urine from Urine, Clean Catch Updated:  08/11/18 0531     Color, UA Light Yellow     Clarity, UA Clear Specific Gravity, UA <=1 005     pH, UA 7 0     Leukocytes, UA Negative     Nitrite, UA Negative     Protein, UA Negative mg/dl      Glucose, UA Negative mg/dl      Ketones, UA Negative mg/dl      Urobilinogen, UA 0 2 E U /dl      Bilirubin, UA Negative     Blood, UA Negative    CBC and differential [[id number]]  (Abnormal) Collected:  08/11/18 0511    Lab Status:  Final result Specimen:  Blood from Vein Updated:  08/11/18 0518     WBC 10 42 (H) Thousand/uL      RBC 4 86 Million/uL      Hemoglobin 14 6 g/dL      Hematocrit 43 6 %      MCV 90 fL      MCH 30 0 pg      MCHC 33 5 g/dL      RDW 12 2 %      MPV 8 9 fL      Platelets 742 Thousands/uL      nRBC 0 /100 WBCs      Neutrophils Relative 59 %      Immat GRANS % 0 %      Lymphocytes Relative 32 %      Monocytes Relative 6 %      Eosinophils Relative 3 %      Basophils Relative 0 %      Neutrophils Absolute 6 09 Thousands/µL      Immature Grans Absolute 0 03 Thousand/uL      Lymphocytes Absolute 3 37 Thousands/µL      Monocytes Absolute 0 63 Thousand/µL      Eosinophils Absolute 0 27 Thousand/µL      Basophils Absolute 0 03 Thousands/µL                  No orders to display              Procedures  Procedures       Phone Contacts  ED Phone Contact    ED Course                               MDM  Number of Diagnoses or Management Options  Diagnosis management comments: Pt is medically cleared for crisis    CritCare Time    Disposition  Final diagnoses:   None     ED Disposition     None      Follow-up Information    None         Patient's Medications   Discharge Prescriptions    No medications on file     No discharge procedures on file      ED Provider  Electronically Signed by           Cecile Handy DO  08/11/18 5939

## 2018-08-11 NOTE — ED NOTES
Crisis worker spoke with patient  Patient is voluntary for admission       Khushi Juarez RN  08/11/18 1000

## 2018-08-11 NOTE — ED NOTES
Pt belongings in unmarked locker  3 bags     Cell  Sneakers  Jeans  Wallet  Socks  Belt  t shirt  Hats  Sneakers  Clothing  Deodorant  $38      Baltazar Ray  08/11/18 1598

## 2018-08-11 NOTE — ED NOTES
Pt calm,cooperative,personal belongings placed in secure cabinet  Pt aware plan of care  1:1 started       Nara Crowley RN  08/11/18 9058

## 2018-08-11 NOTE — ED NOTES
11:30 - There are no beds available in [facility], [facility], [facility], [address] [place] [address], [place] [address], or [facility] said that the pt's information can be sent over in case something opens up  This writer left a message for [employer]  No beds available at: [facility], [facility], [facility] looked at the referral and declined due to pt being out of behavioral health days with his Medicare  [facility] has a bed in [place] and is currently looking at the referral     22:30 - Pt was declined at [facility] because their doctor feels the pt needs long term placement  22:50 - Called [facility] and spoke with Yoko Stark  Reported that the pt requires long term care and this writer is unable to place him in a long term facility  Nicole Rhoades reported that she would not be out to see the pt tonight as he will not be placed overnight so she will come see him in the morning  Relayed this information to the charge nurse  23:04 - Niocle Rhoades called and reported that she spoke with her supervisor and they were in agreement that the pt was stable enough to wait until the morning to be screened  Nicole Rhoades reported that the pt would be a consensual and that process is a bit more in depth than a normal commitment case  This writer again relayed the information to the Charge Nurse      Marques Nevarez MA  Crisis Intervention Worker  08/11/2018

## 2018-08-11 NOTE — ED NOTES
9:20 - Pt is a 26 y o  male who presented to the ED with SI w/plan  When asked what his plan was, pt responded "I want to shoot myself, or overdose, or hang myself, something quick "  When asked if he has access to a gun, pt responded "no, but it really can't be that hard to get one "    Pt reported that he has been seeing shadows moving around at night  He also reported that he does hear voices but they are not command and "sound like a bunch of conversations in my head "  Pt was released from [place] in 5/18  Since then, pt has been seen here at [facility] on 6/21 (d/c home with appt w/Dr Viri Mariee), 6/24 (voluntary admission to [facility]), 7/6 (involuntary admission to [facility]), 8/5 (d/c home )  Pt was also seen in [facility] in [place]  He presented to their ED on 8/6/18 and was released on 8/10/18  This writer recommends inpt tx for pt  Pt is in agreement  Voluntary bed search will be completed      Camryn Prince MA  Crisis Intervention Worker  08/11/18

## 2018-08-12 RX ORDER — TRAZODONE HYDROCHLORIDE 50 MG/1
50 TABLET ORAL ONCE
Status: COMPLETED | OUTPATIENT
Start: 2018-08-12 | End: 2018-08-12

## 2018-08-12 RX ORDER — RISPERIDONE 1 MG/1
2 TABLET, FILM COATED ORAL ONCE
Status: COMPLETED | OUTPATIENT
Start: 2018-08-12 | End: 2018-08-12

## 2018-08-12 RX ORDER — HYDRALAZINE HYDROCHLORIDE 25 MG/1
25 TABLET, FILM COATED ORAL 4 TIMES DAILY
Refills: 0 | COMMUNITY
Start: 2018-07-24

## 2018-08-12 RX ORDER — ALPRAZOLAM 0.5 MG/1
1 TABLET ORAL ONCE
Status: COMPLETED | OUTPATIENT
Start: 2018-08-12 | End: 2018-08-12

## 2018-08-12 RX ORDER — ZOLPIDEM TARTRATE 5 MG/1
10 TABLET ORAL
Status: DISCONTINUED | OUTPATIENT
Start: 2018-08-12 | End: 2018-08-14 | Stop reason: HOSPADM

## 2018-08-12 RX ORDER — HYDRALAZINE HYDROCHLORIDE 25 MG/1
25 TABLET, FILM COATED ORAL ONCE
Status: COMPLETED | OUTPATIENT
Start: 2018-08-12 | End: 2018-08-12

## 2018-08-12 RX ADMIN — RISPERIDONE 2 MG: 1 TABLET ORAL at 22:43

## 2018-08-12 RX ADMIN — HYDRALAZINE HYDROCHLORIDE 25 MG: 25 TABLET, FILM COATED ORAL at 14:07

## 2018-08-12 RX ADMIN — ALPRAZOLAM 1 MG: 0.5 TABLET ORAL at 08:45

## 2018-08-12 RX ADMIN — METOPROLOL TARTRATE 25 MG: 25 TABLET ORAL at 14:06

## 2018-08-12 RX ADMIN — TRAZODONE HYDROCHLORIDE 50 MG: 50 TABLET ORAL at 22:43

## 2018-08-12 RX ADMIN — ZOLPIDEM TARTRATE 10 MG: 5 TABLET ORAL at 22:43

## 2018-08-12 RX ADMIN — ALPRAZOLAM 1 MG: 0.5 TABLET ORAL at 18:43

## 2018-08-12 NOTE — ED NOTES
Patient requesting BP medications  Vitals reassessed as documented  Discussed with Dr Brittny Hernandez   Metoprolol and hydralazine ordered as per medication list      Ken Perez RN  08/12/18 3023

## 2018-08-12 NOTE — ED NOTES
Patient requesting medication for anxiety and for his blood pressure  Vitals assessed  Spoke with Dr Maribel Whiteside  Decision to watch blood pressure and heart rate and not order medications at this time  Medication for anxiety ordered       Oscar Goyal RN  08/12/18 6761

## 2018-08-12 NOTE — ED NOTES
Pt refused ETOH level  Explained to pt that he needs to have this bloodwork done in order to be considered for placement  Pt became agitated and adamantly refused         Abby Patton RN  08/11/18 8796

## 2018-08-12 NOTE — ED NOTES
Pt  Appears calm and cooperative  [facility] at bedside for telepsych       Reba Norman RN  08/12/18 4827

## 2018-08-12 NOTE — ED NOTES
Pt reports feeling anxious, asked for xanax  Dr Sharon Le made aware       Flaca Pond, RN  08/12/18 3051

## 2018-08-12 NOTE — ED NOTES
Patient had one verbal outburst when second patient in secured holding was being loud and belligerent  Patient screamed one time "shut up" and then went to rest on stretcher in an attempt to sleep  No further outbursts       Shalom Ngo RN  08/12/18 2156

## 2018-08-12 NOTE — ED NOTES
The following information was requested by [facility]:    Pt was committed to [facility] 2 years ago  At that time, pt was not a resident of [facility], and as such, our screening unit does not have information regarding why the pt was sent to [facility]  Pt is unable to recall why he was sent, but was able to recall that he was in [facility] at the time  Upon being d/c'd from [facility] in May 2018, the pt was prescribed Risperdal, Xanax, Ambien, and Lamictal  He was also given the dx of Schizophrenia  Pt was questioned about his drug and alcohol hx  Pt reported that he does not drink but has used drugs recreationally (he admitted to marijuana and "mollys  ")  [facility] [address]  in July, 2018  The pt presented to the ED initially as a voluntary pt  While pt was in the ED, he decompensated and asked to go home  Pt continued to decompensate and required a screening for possible commitment  He was screened by [facility] in [place], [place] ([phone] )  He was committed to [address] [place] [address] [place]  for inpt tx  Pt was verbally aggressive towards staff prior to his being admitted to [address] [place] [address] [place] , but did not become physical with anyone at any point  This note will be faxed to [facility] so as to answer any questions they had      Tin Boudreaux MA  Crisis Intervention Worker  08/11/18

## 2018-08-13 VITALS
HEART RATE: 99 BPM | OXYGEN SATURATION: 97 % | TEMPERATURE: 97.7 F | WEIGHT: 235 LBS | RESPIRATION RATE: 16 BRPM | DIASTOLIC BLOOD PRESSURE: 75 MMHG | BODY MASS INDEX: 31 KG/M2 | SYSTOLIC BLOOD PRESSURE: 131 MMHG

## 2018-08-13 RX ORDER — PRAVASTATIN SODIUM 40 MG
40 TABLET ORAL
Status: DISCONTINUED | OUTPATIENT
Start: 2018-08-13 | End: 2018-08-14 | Stop reason: HOSPADM

## 2018-08-13 RX ORDER — ALPRAZOLAM 0.5 MG/1
0.5 TABLET ORAL 3 TIMES DAILY PRN
Status: DISCONTINUED | OUTPATIENT
Start: 2018-08-13 | End: 2018-08-14 | Stop reason: HOSPADM

## 2018-08-13 RX ORDER — RISPERIDONE 1 MG/1
2 TABLET, FILM COATED ORAL DAILY
Status: DISCONTINUED | OUTPATIENT
Start: 2018-08-13 | End: 2018-08-14 | Stop reason: HOSPADM

## 2018-08-13 RX ORDER — HYDRALAZINE HYDROCHLORIDE 25 MG/1
25 TABLET, FILM COATED ORAL EVERY 8 HOURS SCHEDULED
Status: DISCONTINUED | OUTPATIENT
Start: 2018-08-13 | End: 2018-08-14 | Stop reason: HOSPADM

## 2018-08-13 RX ORDER — CHLORAL HYDRATE 500 MG
1000 CAPSULE ORAL DAILY
Status: DISCONTINUED | OUTPATIENT
Start: 2018-08-13 | End: 2018-08-14 | Stop reason: HOSPADM

## 2018-08-13 RX ORDER — LORATADINE 10 MG/1
10 TABLET ORAL DAILY
Status: DISCONTINUED | OUTPATIENT
Start: 2018-08-13 | End: 2018-08-14 | Stop reason: HOSPADM

## 2018-08-13 RX ORDER — SIMVASTATIN 20 MG
20 TABLET ORAL
Status: DISCONTINUED | OUTPATIENT
Start: 2018-08-13 | End: 2018-08-13 | Stop reason: CLARIF

## 2018-08-13 RX ADMIN — PRAVASTATIN SODIUM 40 MG: 40 TABLET ORAL at 20:39

## 2018-08-13 RX ADMIN — METOPROLOL TARTRATE 25 MG: 25 TABLET ORAL at 20:40

## 2018-08-13 RX ADMIN — Medication 1000 MG: at 11:41

## 2018-08-13 RX ADMIN — METFORMIN HYDROCHLORIDE 1000 MG: 500 TABLET, FILM COATED ORAL at 11:37

## 2018-08-13 RX ADMIN — HYDRALAZINE HYDROCHLORIDE 25 MG: 25 TABLET, FILM COATED ORAL at 20:39

## 2018-08-13 RX ADMIN — ALPRAZOLAM 0.5 MG: 0.5 TABLET ORAL at 11:38

## 2018-08-13 RX ADMIN — ALPRAZOLAM 0.5 MG: 0.5 TABLET ORAL at 20:41

## 2018-08-13 RX ADMIN — RISPERIDONE 2 MG: 1 TABLET ORAL at 20:40

## 2018-08-13 RX ADMIN — LORATADINE 10 MG: 10 TABLET ORAL at 11:38

## 2018-08-13 RX ADMIN — ZOLPIDEM TARTRATE 10 MG: 5 TABLET ORAL at 20:56

## 2018-08-13 RX ADMIN — METOPROLOL TARTRATE 25 MG: 25 TABLET ORAL at 11:38

## 2018-08-13 RX ADMIN — HYDRALAZINE HYDROCHLORIDE 25 MG: 25 TABLET, FILM COATED ORAL at 11:38

## 2018-08-13 RX ADMIN — ALPRAZOLAM 0.5 MG: 0.5 TABLET ORAL at 15:08

## 2018-08-13 RX ADMIN — Medication 1 TABLET: at 11:37

## 2018-08-13 NOTE — ED NOTES
Patient calm and cooperative today     Patient showered and changed clothing      Maribel Dillard RN  08/13/18 7370

## 2018-08-13 NOTE — ED NOTES
8/13/18 @ 0720:  Received update from [facility] at [facility]; patient referred to [facility], and they "have a bed "  [facility] will continue to monitor  Zina Garzon Alvaro 87  1115: Patient took shower and ate his lunch  Patient is calm and cooperative  MS Ryann  1200: PES updated Dorita Figueroa from [facility] on patient's behaviors, in that, patient has been calm and cooperative    Sandre Gaucher, MS

## 2018-08-13 NOTE — ED NOTES
[facility] came to tele-psych pt about 15:00 - pt's commitment was changed from consensual to full commitment  Alexander Martinez / Debra Castellano called, pt accepted at [facility] by Dr Jessica Ramsay; Rn report to be called to [phone] [facility])  [facility] called for transport - 01:00 is scheduled pick-up time  Pt, Vicki Sheth, Alexander Martinez and ER staff updated  ([facility] unable to do run until tomorrow)

## 2018-08-14 PROCEDURE — 99285 EMERGENCY DEPT VISIT HI MDM: CPT

## 2018-08-14 NOTE — ED NOTES
Pt reports feeling anxious, requesting something for anxiety  D/w Dr Shira Sánchez, may give pt xanax as ordered 3x/day prn, may give both bp meds at this time, and may give risperidone at this time as it was held this morning for night time administration         Teodoro Sanchez RN  08/13/18 6921

## 2018-08-14 NOTE — ED NOTES
Nurse to Nurse report given to Dieter Camilo RN at [facility]  Per Karolina Taylor, there are no restrictions on transportation times to facility  delano Stevens made aware         Anne Urban RN  08/13/18 2042

## 2018-08-14 NOTE — ED NOTES
Pt transferred via [facility], belongings given(duffle bag and clear bags x2)     Nancy Paredes RN  08/14/18 0001

## 2018-08-14 NOTE — ED NOTES
Pt requesting food to eat  Given lunchbox, remains calm and cooperative       Cherie Ndiaye, RN  08/13/18 2115

## 2018-08-14 NOTE — EMTALA/ACUTE CARE TRANSFER
[facility] EMERGENCY DEPARTMENT  [address] [id number]  Dept: [phone]      EMTALA TRANSFER CONSENT    NAME Milli Wesley                                         1985                              MRN [id number]    I have been informed of my rights regarding examination, treatment, and transfer   by Dr Mitzi De Anda MD    Benefits: Specialized equipment and/or services available at the receiving facility (Include comment)________________________    Risks: Increased discomfort during transfer, Possible worsening of condition or death during transfer      Consent for Transfer:  I acknowledge that my medical condition has been evaluated and explained to me by the emergency department physician or other qualified medical person and/or my attending physician, who has recommended that I be transferred to the service of  Accepting Physician: Dr Martinez House at [address] [place] [address] Name, [address] : [address]  The above potential benefits of such transfer, the potential risks associated with such transfer, and the probable risks of not being transferred have been explained to me, and I fully understand them  The doctor has explained that, in my case, the benefits of transfer outweigh the risks  I agree to be transferred  I authorize the performance of emergency medical procedures and treatments upon me in both transit and upon arrival at the receiving facility  Additionally, I authorize the release of any and all medical records to the receiving facility and request they be transported with me, if possible  I understand that the safest mode of transportation during a medical emergency is an ambulance and that the Hospital advocates the use of this mode of transport   Risks of traveling to the receiving facility by car, including absence of medical control, life sustaining equipment, such as oxygen, and medical personnel has been explained to me and I fully understand them     ([facility])  [  ]  I consent to the stated transfer and to be transported by ambulance/helicopter  [  ]  I consent to the stated transfer, but refuse transportation by ambulance and accept full responsibility for my transportation by car  I understand the risks of non-ambulance transfers and I exonerate the Hospital and its staff from any deterioration in my condition that results from this refusal     X___________________________________________    DATE  18  TIME________  Signature of patient or legally responsible individual signing on patient behalf           RELATIONSHIP TO PATIENT_________________________          Provider Certification    NAME Selene Timmons                                         1985                              MRN [id number]    A medical screening exam was performed on the above named patient  Based on the examination:    Condition Necessitating Transfer The encounter diagnosis was Schizophrenia ([facility] [address] )  Patient Condition: The patient has been stabilized such that within reasonable medical probability, no material deterioration of the patient condition or the condition of the unborn child(garrick) is likely to result from the transfer    Reason for Transfer: Level of Care needed not available at this facility    Transfer Requirements: [address] [place], [place]   · Space available and qualified personnel available for treatment as acknowledged by    · Agreed to accept transfer and to provide appropriate medical treatment as acknowledged by       Dr Dequan Kessler  · Appropriate medical records of the examination and treatment of the patient are provided at the time of transfer   [facility] [address] _______  · Transfer will be performed by qualified personnel from [facility]  and appropriate transfer equipment as required, including the use of necessary and appropriate life support measures      Provider Certification: I have examined the patient and explained the following risks and benefits of being transferred/refusing transfer to the patient/family:  Consent was not obtained as patient is committed to psychiatric facility and transfer is mandated, The patient is stable for psychiatric transfer because they are medically stable, and is protected from harming him/herself or others during transport      Based on these reasonable risks and benefits to the patient and/or the unborn child(garrick), and based upon the information available at the time of the patients examination, I certify that the medical benefits reasonably to be expected from the provision of appropriate medical treatments at another medical facility outweigh the increasing risks, if any, to the individuals medical condition, and in the case of labor to the unborn child, from effecting the transfer      X____________________________________________ DATE 08/13/18        TIME_______      ORIGINAL - SEND TO MEDICAL RECORDS   COPY - SEND WITH PATIENT DURING TRANSFER

## 2018-08-14 NOTE — ED NOTES
Pt requesting ambien at this time despite getting above meds         Earnstine Fuad, JOSE  08/13/18 4350

## 2018-08-14 NOTE — EMTALA/ACUTE CARE TRANSFER
[facility] EMERGENCY DEPARTMENT  [address] [facility] [address]  Dept: [phone]      EMTALA TRANSFER CONSENT    NAME Carmina Trujillo                                         1985                              MRN [id number]    I have been informed of my rights regarding examination, treatment, and transfer   by Dr Sanchez att  providers found    Benefits: Specialized equipment and/or services available at the receiving facility (Include comment)________________________    Risks: Increased discomfort during transfer, Possible worsening of condition or death during transfer      Consent for Transfer:  I acknowledge that my medical condition has been evaluated and explained to me by the emergency department physician or other qualified medical person and/or my attending physician, who has recommended that I be transferred to the service of  Accepting Physician: Dr Abbi Abbasi at [address] [place] [address] Name, [facility] : [place], [place]  The above potential benefits of such transfer, the potential risks associated with such transfer, and the probable risks of not being transferred have been explained to me, and I fully understand them  The doctor has explained that, in my case, the benefits of transfer outweigh the risks  I agree to be transferred  I authorize the performance of emergency medical procedures and treatments upon me in both transit and upon arrival at the receiving facility  Additionally, I authorize the release of any and all medical records to the receiving facility and request they be transported with me, if possible  I understand that the safest mode of transportation during a medical emergency is an ambulance and that the Hospital advocates the use of this mode of transport   Risks of traveling to the receiving facility by car, including absence of medical control, life sustaining equipment, such as oxygen, and medical personnel has been explained to me and I fully understand them     ([facility])  [  ]  I consent to the stated transfer and to be transported by ambulance/helicopter  [  ]  I consent to the stated transfer, but refuse transportation by ambulance and accept full responsibility for my transportation by car  I understand the risks of non-ambulance transfers and I exonerate the Hospital and its staff from any deterioration in my condition that results from this refusal     X___________________________________________    DATE  18  TIME________  Signature of patient or legally responsible individual signing on patient behalf           RELATIONSHIP TO PATIENT_________________________          Provider Certification    NAME Cristiana Mckeon                                         1985                              MRN [id number]    A medical screening exam was performed on the above named patient  Based on the examination:    Condition Necessitating Transfer The encounter diagnosis was Schizophrenia ([facility] [address] )  Patient Condition: The patient has been stabilized such that within reasonable medical probability, no material deterioration of the patient condition or the condition of the unborn child(garrick) is likely to result from the transfer    Reason for Transfer: Level of Care needed not available at this facility    Transfer Requirements: [address]   · Space available and qualified personnel available for treatment as acknowledged by    · Agreed to accept transfer and to provide appropriate medical treatment as acknowledged by       Dr Josh Mares  · Appropriate medical records of the examination and treatment of the patient are provided at the time of transfer   [facility] [address] _______  · Transfer will be performed by qualified personnel from [facility]  and appropriate transfer equipment as required, including the use of necessary and appropriate life support measures      Provider Certification: I have examined the patient and explained the following risks and benefits of being transferred/refusing transfer to the patient/family:  Consent was not obtained as patient is committed to psychiatric facility and transfer is mandated, The patient is stable for psychiatric transfer because they are medically stable, and is protected from harming him/herself or others during transport      Based on these reasonable risks and benefits to the patient and/or the unborn child(garrick), and based upon the information available at the time of the patients examination, I certify that the medical benefits reasonably to be expected from the provision of appropriate medical treatments at another medical facility outweigh the increasing risks, if any, to the individuals medical condition, and in the case of labor to the unborn child, from effecting the transfer      X____________________________________________ DATE 08/13/18        TIME_______      ORIGINAL - SEND TO MEDICAL RECORDS   COPY - SEND WITH PATIENT DURING TRANSFER

## 2018-08-15 ENCOUNTER — HOSPITAL ENCOUNTER (EMERGENCY)
Facility: HOSPITAL | Age: 33
End: 2018-08-16
Attending: EMERGENCY MEDICINE | Admitting: EMERGENCY MEDICINE
Payer: MEDICARE

## 2018-08-15 DIAGNOSIS — F32.A DEPRESSION: Primary | ICD-10-CM

## 2018-08-15 DIAGNOSIS — R45.851 SUICIDAL THOUGHTS: ICD-10-CM

## 2018-08-15 LAB
ALBUMIN SERPL BCP-MCNC: 3.9 G/DL (ref 3.5–5)
ALP SERPL-CCNC: 65 U/L (ref 46–116)
ALT SERPL W P-5'-P-CCNC: 27 U/L (ref 12–78)
AMPHETAMINES SERPL QL SCN: NEGATIVE
ANION GAP SERPL CALCULATED.3IONS-SCNC: 8 MMOL/L (ref 4–13)
AST SERPL W P-5'-P-CCNC: 11 U/L (ref 5–45)
BARBITURATES UR QL: NEGATIVE
BASOPHILS # BLD AUTO: 0.03 THOUSANDS/ΜL (ref 0–0.1)
BASOPHILS NFR BLD AUTO: 0 % (ref 0–1)
BENZODIAZ UR QL: NEGATIVE
BILIRUB SERPL-MCNC: 0.3 MG/DL (ref 0.2–1)
BILIRUB UR QL STRIP: NEGATIVE
BUN SERPL-MCNC: 10 MG/DL (ref 5–25)
CALCIUM SERPL-MCNC: 9.1 MG/DL (ref 8.3–10.1)
CHLORIDE SERPL-SCNC: 102 MMOL/L (ref 100–108)
CLARITY UR: CLEAR
CO2 SERPL-SCNC: 28 MMOL/L (ref 21–32)
COCAINE UR QL: NEGATIVE
COLOR UR: NORMAL
CREAT SERPL-MCNC: 0.89 MG/DL (ref 0.6–1.3)
EOSINOPHIL # BLD AUTO: 0.16 THOUSAND/ΜL (ref 0–0.61)
EOSINOPHIL NFR BLD AUTO: 2 % (ref 0–6)
ERYTHROCYTE [DISTWIDTH] IN BLOOD BY AUTOMATED COUNT: 12.6 % (ref 11.6–15.1)
ETHANOL SERPL-MCNC: <3 MG/DL (ref 0–3)
GFR SERPL CREATININE-BSD FRML MDRD: 111 ML/MIN/1.73SQ M
GLUCOSE SERPL-MCNC: 121 MG/DL (ref 65–140)
GLUCOSE UR STRIP-MCNC: NEGATIVE MG/DL
HCT VFR BLD AUTO: 45.1 % (ref 36.5–49.3)
HGB BLD-MCNC: 14.8 G/DL (ref 12–17)
HGB UR QL STRIP.AUTO: NEGATIVE
IMM GRANULOCYTES # BLD AUTO: 0.05 THOUSAND/UL (ref 0–0.2)
IMM GRANULOCYTES NFR BLD AUTO: 1 % (ref 0–2)
KETONES UR STRIP-MCNC: NEGATIVE MG/DL
LEUKOCYTE ESTERASE UR QL STRIP: NEGATIVE
LYMPHOCYTES # BLD AUTO: 2.32 THOUSANDS/ΜL (ref 0.6–4.47)
LYMPHOCYTES NFR BLD AUTO: 26 % (ref 14–44)
MCH RBC QN AUTO: 30 PG (ref 26.8–34.3)
MCHC RBC AUTO-ENTMCNC: 32.8 G/DL (ref 31.4–37.4)
MCV RBC AUTO: 92 FL (ref 82–98)
METHADONE UR QL: NEGATIVE
MONOCYTES # BLD AUTO: 0.4 THOUSAND/ΜL (ref 0.17–1.22)
MONOCYTES NFR BLD AUTO: 5 % (ref 4–12)
NEUTROPHILS # BLD AUTO: 5.96 THOUSANDS/ΜL (ref 1.85–7.62)
NEUTS SEG NFR BLD AUTO: 66 % (ref 43–75)
NITRITE UR QL STRIP: NEGATIVE
NRBC BLD AUTO-RTO: 0 /100 WBCS
OPIATES UR QL SCN: NEGATIVE
PCP UR QL: NEGATIVE
PH UR STRIP.AUTO: 7 [PH] (ref 5–9)
PLATELET # BLD AUTO: 304 THOUSANDS/UL (ref 149–390)
PMV BLD AUTO: 9.1 FL (ref 8.9–12.7)
POTASSIUM SERPL-SCNC: 3.9 MMOL/L (ref 3.5–5.3)
PROT SERPL-MCNC: 7 G/DL (ref 6.4–8.2)
PROT UR STRIP-MCNC: NEGATIVE MG/DL
RBC # BLD AUTO: 4.93 MILLION/UL (ref 3.88–5.62)
SODIUM SERPL-SCNC: 138 MMOL/L (ref 136–145)
SP GR UR STRIP.AUTO: <=1.005 (ref 1–1.03)
THC UR QL: POSITIVE
UROBILINOGEN UR QL STRIP.AUTO: 0.2 E.U./DL
WBC # BLD AUTO: 8.92 THOUSAND/UL (ref 4.31–10.16)

## 2018-08-15 PROCEDURE — 36415 COLL VENOUS BLD VENIPUNCTURE: CPT | Performed by: EMERGENCY MEDICINE

## 2018-08-15 PROCEDURE — 85025 COMPLETE CBC W/AUTO DIFF WBC: CPT | Performed by: EMERGENCY MEDICINE

## 2018-08-15 PROCEDURE — 80307 DRUG TEST PRSMV CHEM ANLYZR: CPT | Performed by: EMERGENCY MEDICINE

## 2018-08-15 PROCEDURE — 80320 DRUG SCREEN QUANTALCOHOLS: CPT | Performed by: EMERGENCY MEDICINE

## 2018-08-15 PROCEDURE — 81003 URINALYSIS AUTO W/O SCOPE: CPT | Performed by: EMERGENCY MEDICINE

## 2018-08-15 PROCEDURE — 80053 COMPREHEN METABOLIC PANEL: CPT | Performed by: EMERGENCY MEDICINE

## 2018-08-15 RX ORDER — ALPRAZOLAM 0.5 MG/1
0.5 TABLET ORAL 3 TIMES DAILY PRN
Status: DISCONTINUED | OUTPATIENT
Start: 2018-08-15 | End: 2018-08-16 | Stop reason: HOSPADM

## 2018-08-15 RX ORDER — ASPIRIN 81 MG/1
81 TABLET ORAL DAILY
Status: DISCONTINUED | OUTPATIENT
Start: 2018-08-15 | End: 2018-08-16 | Stop reason: HOSPADM

## 2018-08-15 RX ORDER — RISPERIDONE 1 MG/1
2 TABLET, FILM COATED ORAL DAILY
Status: DISCONTINUED | OUTPATIENT
Start: 2018-08-15 | End: 2018-08-16 | Stop reason: HOSPADM

## 2018-08-15 RX ADMIN — ASPIRIN 81 MG: 81 TABLET, COATED ORAL at 15:36

## 2018-08-15 RX ADMIN — ALPRAZOLAM 0.5 MG: 0.5 TABLET ORAL at 17:32

## 2018-08-15 RX ADMIN — RISPERIDONE 2 MG: 1 TABLET ORAL at 15:36

## 2018-08-15 NOTE — ED PROVIDER NOTES
History  Chief Complaint   Patient presents with    Psychiatric Evaluation     states recently at [facility], discharged  states has not had meds for a couple of days  having suicidal thoughts, c/o his anxiety is killing him       History provided by:  Patient   used: No    Psychiatric Evaluation   Presenting symptoms: depression and suicidal thoughts    Presenting symptoms: no agitation, no disorganized speech, no disorganized thought process and no paranoid behavior    Degree of incapacity (severity): Moderate  Onset quality:  Gradual  Duration:  1 day  Timing:  Constant  Progression:  Worsening  Chronicity:  Recurrent  Treatment compliance:  Some of the time  Relieved by: Anti-anxiety medications  Worsened by:  Nothing  Ineffective treatments:  None tried  Associated symptoms: anxiety    Associated symptoms: no abdominal pain, no appetite change, no chest pain, no decreased need for sleep, not distractible, no fatigue and no headaches    Risk factors: hx of mental illness    Risk factors: no hx of suicide attempts and no neurological disease        Prior to Admission Medications   Prescriptions Last Dose Informant Patient Reported? Taking?    ALPRAZolam (XANAX) 0 5 mg tablet Past Week at Unknown time  No Yes   Sig: Take 1 tablet (0 5 mg total) by mouth 3 (three) times a day as needed for anxiety for up to 10 doses   Multiple Vitamin (MULTIVITAMIN) capsule Past Week at Unknown time  Yes Yes   Sig: Take 1 capsule by mouth daily   Omega-3 Fatty Acids (FISH OIL) 1,000 mg Past Week at Unknown time  Yes Yes   Sig: Take 1,000 mg by mouth daily   aspirin (ECOTRIN LOW STRENGTH) 81 mg EC tablet Unknown at Unknown time  Yes No   Sig: Take 81 mg by mouth daily   bisacodyl (DULCOLAX) 5 mg EC tablet Past Week at Unknown time  Yes Yes   Sig: Take 15 mg by mouth daily   colesevelam (WELCHOL) 625 mg tablet Past Week at Unknown time  Yes Yes   Sig: Take 1,875 mg by mouth 2 (two) times a day with meals hydrALAZINE (APRESOLINE) 25 mg tablet Past Week at Unknown time  Yes Yes   Sig: Take 25 mg by mouth 4 (four) times a day   loratadine (CLARITIN) 10 mg tablet Past Week at Unknown time  Yes Yes   Sig: Take 10 mg by mouth daily   metFORMIN (FORTAMET) 1000 MG (OSM) 24 hr tablet Past Week at Unknown time  Yes Yes   Sig: Take 1,000 mg by mouth daily with breakfast   metoprolol tartrate (LOPRESSOR) 50 mg tablet Past Week at Unknown time  Yes Yes   Sig: Take 25 mg by mouth every 12 (twelve) hours   risperiDONE (RisperDAL) 2 mg tablet Past Week at Unknown time  No Yes   Sig: Take 1 tablet (2 mg total) by mouth daily for 15 days   simvastatin (ZOCOR) 20 mg tablet Past Week at Unknown time  Yes Yes   Sig: Take 20 mg by mouth daily at bedtime      Facility-Administered Medications: None       Past Medical History:   Diagnosis Date    Diabetes mellitus ([facility] [address] )     Hyperlipidemia     Hypertension     Psychiatric disorder     anxiety, depression, SI, hallucinating    Rapid heart rate        History reviewed  No pertinent surgical history  History reviewed  No pertinent family history  I have reviewed and agree with the history as documented  Social History   Substance Use Topics    Smoking status: Current Every Day Smoker     Packs/day: 0 50    Smokeless tobacco: Never Used    Alcohol use Yes      Comment: socially        Review of Systems   Constitutional: Negative for activity change, appetite change, chills, fatigue and fever  HENT: Negative for congestion, dental problem, facial swelling, sore throat, tinnitus and trouble swallowing  Eyes: Negative for pain, discharge and itching  Respiratory: Negative for apnea, chest tightness and wheezing  Cardiovascular: Negative for chest pain, palpitations and leg swelling  Gastrointestinal: Negative for abdominal pain and nausea  Genitourinary: Negative for difficulty urinating, dysuria and flank pain     Musculoskeletal: Negative for arthralgias, back pain, gait problem, joint swelling and neck pain  Skin: Negative for color change, rash and wound  Neurological: Negative for dizziness, facial asymmetry and headaches  Psychiatric/Behavioral: Positive for suicidal ideas  Negative for agitation, behavioral problems and paranoia  The patient is nervous/anxious  All other systems reviewed and are negative  Physical Exam  Physical Exam   Constitutional: He is oriented to person, place, and time  He appears well-developed and well-nourished  No distress  HENT:   Head: Normocephalic and atraumatic  Right Ear: External ear normal    Left Ear: External ear normal    Eyes: EOM are normal  Pupils are equal, round, and reactive to light  Right eye exhibits no discharge  Left eye exhibits no discharge  Neck: Normal range of motion  Neck supple  No tracheal deviation present  No thyromegaly present  Cardiovascular: Normal rate and regular rhythm  No murmur heard  Pulmonary/Chest: Effort normal and breath sounds normal    Abdominal: Soft  Bowel sounds are normal  He exhibits no distension  There is no tenderness  Musculoskeletal: Normal range of motion  He exhibits no edema or deformity  Neurological: He is alert and oriented to person, place, and time  No cranial nerve deficit  He exhibits normal muscle tone  Skin: Skin is warm  Capillary refill takes less than 2 seconds  He is not diaphoretic  Psychiatric: He has a normal mood and affect  His behavior is normal  Thought content is not paranoid and not delusional  He expresses suicidal ideation  He expresses no homicidal ideation  He expresses suicidal plans  He expresses no homicidal plans  Nursing note and vitals reviewed        Vital Signs  ED Triage Vitals [08/15/18 1243]   Temperature Pulse Respirations Blood Pressure SpO2   97 8 °F (36 6 °C) 86 20 158/95 97 %      Temp Source Heart Rate Source Patient Position - Orthostatic VS BP Location FiO2 (%)   Tympanic Monitor Sitting Right arm -- Pain Score       7           Vitals:    08/15/18 1243   BP: 158/95   Pulse: 86   Patient Position - Orthostatic VS: Sitting       Visual Acuity      ED Medications  Medications   ALPRAZolam (XANAX) tablet 0 5 mg (0 5 mg Oral Given 8/15/18 1732)   aspirin (ECOTRIN LOW STRENGTH) EC tablet 81 mg (81 mg Oral Given 8/15/18 1536)   metoprolol tartrate (LOPRESSOR) tablet 25 mg (not administered)   risperiDONE (RisperDAL) tablet 2 mg (2 mg Oral Given 8/15/18 1536)       Diagnostic Studies  Results Reviewed     Procedure Component Value Units Date/Time    Comprehensive metabolic panel [[id number]] Collected:  08/15/18 1452    Lab Status:  Final result Specimen:  Blood from Arm, Left Updated:  08/15/18 1516     Sodium 138 mmol/L      Potassium 3 9 mmol/L      Chloride 102 mmol/L      CO2 28 mmol/L      Anion Gap 8 mmol/L      BUN 10 mg/dL      Creatinine 0 89 mg/dL      Glucose 121 mg/dL      Calcium 9 1 mg/dL      AST 11 U/L      ALT 27 U/L      Alkaline Phosphatase 65 U/L      Total Protein 7 0 g/dL      Albumin 3 9 g/dL      Total Bilirubin 0 30 mg/dL      eGFR 111 ml/min/1 73sq m     Narrative:         National Kidney Disease Education Program recommendations are as follows:  GFR calculation is accurate only with a steady state creatinine  Chronic Kidney disease less than 60 ml/min/1 73 sq  meters  Kidney failure less than 15 ml/min/1 73 sq  meters  Rapid drug screen, urine [[id number]]  (Abnormal) Collected:  08/15/18 1451    Lab Status:  Final result Specimen:  Urine from Urine, Clean Catch Updated:  08/15/18 1515     Amph/Meth UR Negative     Barbiturate Ur Negative     Benzodiazepine Urine Negative     Cocaine Urine Negative     Methadone Urine Negative     Opiate Urine Negative     PCP Ur Negative     THC Urine Positive (A)    Narrative:         Presumptive report  If requested, specimen will be sent to reference lab for confirmation  FOR MEDICAL PURPOSES ONLY     IF CONFIRMATION NEEDED PLEASE CONTACT THE LAB WITHIN 5 DAYS      Drug Screen Cutoff Levels:  AMPHETAMINE/METHAMPHETAMINES  1000 ng/mL  BARBITURATES     200 ng/mL  BENZODIAZEPINES     200 ng/mL  COCAINE      300 ng/mL  METHADONE      300 ng/mL  OPIATES      300 ng/mL  PHENCYCLIDINE     25 ng/mL  THC       50 ng/mL    Ethanol [[id number]]  (Normal) Collected:  08/15/18 1452    Lab Status:  Final result Specimen:  Blood from Arm, Left Updated:  08/15/18 1515     Ethanol Lvl <3 mg/dL     CBC and differential [[id number]] Collected:  08/15/18 1452    Lab Status:  Final result Specimen:  Blood from Arm, Left Updated:  08/15/18 1507     WBC 8 92 Thousand/uL      RBC 4 93 Million/uL      Hemoglobin 14 8 g/dL      Hematocrit 45 1 %      MCV 92 fL      MCH 30 0 pg      MCHC 32 8 g/dL      RDW 12 6 %      MPV 9 1 fL      Platelets 384 Thousands/uL      nRBC 0 /100 WBCs      Neutrophils Relative 66 %      Immat GRANS % 1 %      Lymphocytes Relative 26 %      Monocytes Relative 5 %      Eosinophils Relative 2 %      Basophils Relative 0 %      Neutrophils Absolute 5 96 Thousands/µL      Immature Grans Absolute 0 05 Thousand/uL      Lymphocytes Absolute 2 32 Thousands/µL      Monocytes Absolute 0 40 Thousand/µL      Eosinophils Absolute 0 16 Thousand/µL      Basophils Absolute 0 03 Thousands/µL     UA (URINE) with reflex to Microscopic [[id number]] Collected:  08/15/18 1451    Lab Status:  Final result Specimen:  Urine from Urine, Clean Catch Updated:  08/15/18 1501     Color, UA Light Yellow     Clarity, UA Clear     Specific Gravity, UA <=1 005     pH, UA 7 0     Leukocytes, UA Negative     Nitrite, UA Negative     Protein, UA Negative mg/dl      Glucose, UA Negative mg/dl      Ketones, UA Negative mg/dl      Urobilinogen, UA 0 2 E U /dl      Bilirubin, UA Negative     Blood, UA Negative                 No orders to display              Procedures  Procedures       Phone Contacts  ED Phone Contact    ED Course                               MDM  Number of Diagnoses or Management Options  Diagnosis management comments: Patient with history of anxiety, depression, recent admission for the same presents emergency department for evaluation of worsening anxiety, depression with plan to overdose  He has not overdosed on medications  Has not tried to kill himself  Vital signs stable  Patient anxious on exam and continues to endorse suicidal ideation  Laboratory studies for psychiatric screening performed reviewed by myself  Patient medically clear for psychiatric evaluation and possible placement  Home medications ordered  Amount and/or Complexity of Data Reviewed  Clinical lab tests: ordered and reviewed  Tests in the medicine section of CPT®: ordered and reviewed    Risk of Complications, Morbidity, and/or Mortality  Presenting problems: moderate  Diagnostic procedures: moderate  Management options: moderate      CritCare Time    Disposition  Final diagnoses:   Depression   Suicidal thoughts     Time reflects when diagnosis was documented in both MDM as applicable and the Disposition within this note     Time User Action Codes Description Comment    8/15/2018  8:20 PM Tere Trayolr [F32 9] Depression     8/15/2018  8:20 PM Fady Owens [R45 [id number]] Suicidal thoughts       ED Disposition     None      Follow-up Information    None         Patient's Medications   Discharge Prescriptions    No medications on file     No discharge procedures on file      ED Provider  Electronically Signed by           Ankita Jara MD  08/15/18 2235

## 2018-08-15 NOTE — ED NOTES
8/15/18 @ 1220:  [facility] was discharged from [facility] yesterday afternoon after arriving in the early morning  Patient was under commitment status, but discharged in less than 12 hours  Nathaniel Brown stated that [facility] reported that patient told them that he "lied about being suicidal and was medication seeking and not really suicidal "  With that being said, [facility] discharged patient  MS Ryann    1310: Patient arrived at ED several minutes ago  PES talked with patient, who presents distraught with blunted affect, low tone speech, and depressed body language, saying, "I feel like an idiot; I feel stupid; they asked me if I wanted to leave and I said yes; they stopped my Xanax completely and discharged me; my head still isn't right and I think I'm withdrawing from the Xanax "  Patient didn't want to talk, but was respectful about it, in that he asked if he could rest a little bit before talking further, and of course, PES was in agreement  [facility] that [facility] was in the ED, and they said, "we're not surprised "  PES will continue to assess  VIJAYluke Jessee: Patient requested "something for anxiety "  PES informed ED RN    Crystal Escobedo, MS

## 2018-08-15 NOTE — ED NOTES
Faxed screening request to [facility] @ 15:30 - called to verify receipt and spoke with Chanel Ceron  She will discuss with her supervisor and get back to us  About 16:30  Pt requested an update - PES explained that [facility] was called to screen pt b/c he has had so many recent admissions it would be likely that hospitals would not accept him as a voluntary pt  [facility] / Chanel Ceron called at [facility] - she will be over 17:00  [facility] / Karan Martini arrived in ER about 17:50  Tele-psych to be completed on the over-night shift

## 2018-08-15 NOTE — ED NOTES
Remains cooperative  Awaiting crisis eval   C/O anxiety  Laying on stretcher  Ate 100% supper       Elisa Castle RN  08/15/18 8216

## 2018-08-15 NOTE — ED NOTES
Pt Belongings:      Shirts x2  Socks  Pants   Shoes   [address] S  [employer]   Cell phone  [address]   Cell phone    Selene Leaver  08/15/18 8354

## 2018-08-15 NOTE — ED NOTES
Patient used bathroom, asked for something to eat, dinner tray ordered        Falguni Vo  08/15/18 1542

## 2018-08-16 VITALS
OXYGEN SATURATION: 97 % | WEIGHT: 230 LBS | TEMPERATURE: 97.8 F | DIASTOLIC BLOOD PRESSURE: 85 MMHG | HEART RATE: 79 BPM | RESPIRATION RATE: 16 BRPM | SYSTOLIC BLOOD PRESSURE: 140 MMHG | BODY MASS INDEX: 30.34 KG/M2

## 2018-08-16 PROCEDURE — 99285 EMERGENCY DEPT VISIT HI MDM: CPT

## 2018-08-16 RX ORDER — ALPRAZOLAM 0.5 MG/1
0.5 TABLET ORAL 3 TIMES DAILY PRN
Status: DISCONTINUED | OUTPATIENT
Start: 2018-08-16 | End: 2018-08-16 | Stop reason: HOSPADM

## 2018-08-16 RX ADMIN — METOPROLOL TARTRATE 25 MG: 25 TABLET ORAL at 01:36

## 2018-08-16 RX ADMIN — ALPRAZOLAM 0.5 MG: 0.5 TABLET ORAL at 18:33

## 2018-08-16 RX ADMIN — METOPROLOL TARTRATE 25 MG: 25 TABLET ORAL at 20:05

## 2018-08-16 RX ADMIN — RISPERIDONE 2 MG: 1 TABLET ORAL at 10:23

## 2018-08-16 RX ADMIN — ALPRAZOLAM 0.5 MG: 0.5 TABLET ORAL at 10:52

## 2018-08-16 RX ADMIN — METOPROLOL TARTRATE 25 MG: 25 TABLET ORAL at 10:23

## 2018-08-16 RX ADMIN — ALPRAZOLAM 0.5 MG: 0.5 TABLET ORAL at 01:35

## 2018-08-16 NOTE — ED NOTES
Pt rang bell requesting  A nerve pill,informed him it is too early   Pt ok with that,requested more food about 1/2 hr ago and was given a sandwich       Shanti Romano RN  08/16/18 5562

## 2018-08-16 NOTE — ED NOTES
8/16/18 @ 0710:  [facility]; bed [facility] will begin; patient was committed by telepsych  ED staff stated that patient was calm and compliant all night  Brook Mo, [address] [place] [address] Street Se: Patient has been sleeping all morning    Brook Mo, MS

## 2018-08-16 NOTE — EMTALA/ACUTE CARE TRANSFER
[facility] EMERGENCY DEPARTMENT  [id number]  Dept: [phone]      EMTALA TRANSFER CONSENT    NAME Mery MEZA 1985                              MRN [id number]    I have been informed of my rights regarding examination, treatment, and transfer   by Dr Sanchez att  providers found    Benefits: Specialized equipment and/or services available at the receiving facility (Include comment)________________________    Risks: Increased discomfort during transfer, Possible worsening of condition or death during transfer      Consent for Transfer:  I acknowledge that my medical condition has been evaluated and explained to me by the emergency department physician or other qualified medical person and/or my attending physician, who has recommended that I be transferred to the service of  Accepting Physician: Dr Anila Harrison at [facility] Name, [facility] : [place], [place]  The above potential benefits of such transfer, the potential risks associated with such transfer, and the probable risks of not being transferred have been explained to me, and I fully understand them  The doctor has explained that, in my case, the benefits of transfer outweigh the risks  I agree to be transferred  I authorize the performance of emergency medical procedures and treatments upon me in both transit and upon arrival at the receiving facility  Additionally, I authorize the release of any and all medical records to the receiving facility and request they be transported with me, if possible  I understand that the safest mode of transportation during a medical emergency is an ambulance and that the Hospital advocates the use of this mode of transport   Risks of traveling to the receiving facility by car, including absence of medical control, life sustaining equipment, such as oxygen, and medical personnel has been explained to me and I fully understand them     ([facility])  [  ]  I consent to the stated transfer and to be transported by ambulance/helicopter  [  ]  I consent to the stated transfer, but refuse transportation by ambulance and accept full responsibility for my transportation by car  I understand the risks of non-ambulance transfers and I exonerate the Hospital and its staff from any deterioration in my condition that results from this refusal     X___________________________________________    DATE  18  TIME________  Signature of patient or legally responsible individual signing on patient behalf           RELATIONSHIP TO PATIENT_________________________          Provider Certification    NAME Jordan Dunlap                                        [facility] 1985                              MRN [id number]    A medical screening exam was performed on the above named patient  Based on the examination:    Condition Necessitating Transfer The primary encounter diagnosis was Depression  A diagnosis of Suicidal thoughts was also pertinent to this visit      Patient Condition: The patient has been stabilized such that within reasonable medical probability, no material deterioration of the patient condition or the condition of the unborn child(garrick) is likely to result from the transfer    Reason for Transfer: Level of Care needed not available at this facility    Transfer Requirements: [address] [place], [place]   · Space available and qualified personnel available for treatment as acknowledged by    · Agreed to accept transfer and to provide appropriate medical treatment as acknowledged by       Dr Davis Section  · Appropriate medical records of the examination and treatment of the patient are provided at the time of transfer   [facility], [address] _______  · Transfer will be performed by qualified personnel from [facility] [address]  and appropriate transfer equipment as required, including the use of necessary and appropriate life support measures  Provider Certification: I have examined the patient and explained the following risks and benefits of being transferred/refusing transfer to the patient/family:  The patient is stable for psychiatric transfer because they are medically stable, and is protected from harming him/herself or others during transport      Based on these reasonable risks and benefits to the patient and/or the unborn child(garrick), and based upon the information available at the time of the patients examination, I certify that the medical benefits reasonably to be expected from the provision of appropriate medical treatments at another medical facility outweigh the increasing risks, if any, to the individuals medical condition, and in the case of labor to the unborn child, from effecting the transfer      X____________________________________________ DATE 08/16/18        TIME_______      ORIGINAL - SEND TO MEDICAL RECORDS   COPY - SEND WITH PATIENT DURING TRANSFER

## 2018-11-02 NOTE — ED NOTES
Patient asking for PRN ativan  States "I feel myself getting agitated"       Alina Bernal RN  07/10/18 2112 negative